# Patient Record
Sex: FEMALE | Race: WHITE | NOT HISPANIC OR LATINO | Employment: OTHER | URBAN - METROPOLITAN AREA
[De-identification: names, ages, dates, MRNs, and addresses within clinical notes are randomized per-mention and may not be internally consistent; named-entity substitution may affect disease eponyms.]

---

## 2019-07-19 ENCOUNTER — TRANSCRIBE ORDERS (OUTPATIENT)
Dept: LAB | Facility: HOSPITAL | Age: 69
End: 2019-07-19

## 2019-07-19 ENCOUNTER — APPOINTMENT (OUTPATIENT)
Dept: LAB | Facility: HOSPITAL | Age: 69
End: 2019-07-19

## 2019-07-19 ENCOUNTER — LAB REQUISITION (OUTPATIENT)
Dept: LAB | Facility: HOSPITAL | Age: 69
End: 2019-07-19

## 2019-07-19 DIAGNOSIS — Z79.01 LONG TERM (CURRENT) USE OF ANTICOAGULANTS: ICD-10-CM

## 2019-07-19 DIAGNOSIS — Z79.01 LONG TERM CURRENT USE OF ANTICOAGULANT: ICD-10-CM

## 2019-07-19 DIAGNOSIS — T81.718A IATROGENIC PULMONARY EMBOLISM AND INFARCTION, INITIAL ENCOUNTER (HCC): Primary | ICD-10-CM

## 2019-07-19 DIAGNOSIS — I26.99 OTHER PULMONARY EMBOLISM WITHOUT ACUTE COR PULMONALE (HCC): ICD-10-CM

## 2019-07-19 DIAGNOSIS — I26.99 IATROGENIC PULMONARY EMBOLISM AND INFARCTION, INITIAL ENCOUNTER (HCC): Primary | ICD-10-CM

## 2019-07-19 LAB
BASOPHILS # BLD AUTO: 0.02 10*3/MM3 (ref 0–0.2)
BASOPHILS NFR BLD AUTO: 0.6 % (ref 0–1.5)
DEPRECATED RDW RBC AUTO: 49 FL (ref 37–54)
EOSINOPHIL # BLD AUTO: 0.27 10*3/MM3 (ref 0–0.4)
EOSINOPHIL NFR BLD AUTO: 8.7 % (ref 0.3–6.2)
ERYTHROCYTE [DISTWIDTH] IN BLOOD BY AUTOMATED COUNT: 13.6 % (ref 12.3–15.4)
HCT VFR BLD AUTO: 30.8 % (ref 34–46.6)
HGB BLD-MCNC: 9.8 G/DL (ref 12–15.9)
IMM GRANULOCYTES # BLD AUTO: 0.01 10*3/MM3 (ref 0–0.05)
IMM GRANULOCYTES NFR BLD AUTO: 0.3 % (ref 0–0.5)
INR PPP: 1.22 (ref 0.85–1.16)
LYMPHOCYTES # BLD AUTO: 0.74 10*3/MM3 (ref 0.7–3.1)
LYMPHOCYTES NFR BLD AUTO: 23.9 % (ref 19.6–45.3)
MCH RBC QN AUTO: 31 PG (ref 26.6–33)
MCHC RBC AUTO-ENTMCNC: 31.8 G/DL (ref 31.5–35.7)
MCV RBC AUTO: 97.5 FL (ref 79–97)
MONOCYTES # BLD AUTO: 0.51 10*3/MM3 (ref 0.1–0.9)
MONOCYTES NFR BLD AUTO: 16.5 % (ref 5–12)
NEUTROPHILS # BLD AUTO: 1.54 10*3/MM3 (ref 1.7–7)
NEUTROPHILS NFR BLD AUTO: 50 % (ref 42.7–76)
NRBC BLD AUTO-RTO: 0 /100 WBC (ref 0–0.2)
PLAT MORPH BLD: NORMAL
PLATELET # BLD AUTO: 217 10*3/MM3 (ref 140–450)
PMV BLD AUTO: 9.4 FL (ref 6–12)
PROTHROMBIN TIME: 14.9 SECONDS (ref 11.2–14.3)
RBC # BLD AUTO: 3.16 10*6/MM3 (ref 3.77–5.28)
RBC MORPH BLD: NORMAL
WBC MORPH BLD: NORMAL
WBC NRBC COR # BLD: 3.09 10*3/MM3 (ref 3.4–10.8)

## 2019-07-19 PROCEDURE — 85610 PROTHROMBIN TIME: CPT | Performed by: ORTHOPAEDIC SURGERY

## 2019-07-19 PROCEDURE — 85025 COMPLETE CBC W/AUTO DIFF WBC: CPT | Performed by: ORTHOPAEDIC SURGERY

## 2019-07-19 PROCEDURE — 85007 BL SMEAR W/DIFF WBC COUNT: CPT | Performed by: ORTHOPAEDIC SURGERY

## 2023-02-15 ENCOUNTER — APPOINTMENT (OUTPATIENT)
Dept: GENERAL RADIOLOGY | Facility: HOSPITAL | Age: 73
End: 2023-02-15
Payer: MEDICARE

## 2023-02-15 ENCOUNTER — APPOINTMENT (OUTPATIENT)
Dept: CT IMAGING | Facility: HOSPITAL | Age: 73
End: 2023-02-15
Payer: MEDICARE

## 2023-02-15 ENCOUNTER — HOSPITAL ENCOUNTER (OUTPATIENT)
Facility: HOSPITAL | Age: 73
Setting detail: OBSERVATION
Discharge: HOME | End: 2023-02-16
Attending: EMERGENCY MEDICINE | Admitting: INTERNAL MEDICINE
Payer: MEDICARE

## 2023-02-15 DIAGNOSIS — Z78.9 ALCOHOL USE: ICD-10-CM

## 2023-02-15 DIAGNOSIS — T07.XXXA MULTIPLE CONTUSIONS: ICD-10-CM

## 2023-02-15 DIAGNOSIS — R40.20 LOSS OF CONSCIOUSNESS: ICD-10-CM

## 2023-02-15 DIAGNOSIS — E87.1 HYPONATREMIA: Primary | ICD-10-CM

## 2023-02-15 PROBLEM — R55 SYNCOPE: Status: ACTIVE | Noted: 2023-02-15

## 2023-02-15 PROBLEM — I10 ESSENTIAL HYPERTENSION: Status: ACTIVE | Noted: 2023-02-15

## 2023-02-15 PROBLEM — R10.9 FLANK PAIN: Status: ACTIVE | Noted: 2023-02-15

## 2023-02-15 PROBLEM — F32.A MILD DEPRESSION: Status: ACTIVE | Noted: 2023-02-15

## 2023-02-15 LAB
ALBUMIN SERPL-MCNC: 4.6 G/DL (ref 3.5–5.2)
ALBUMIN/GLOB SERPL: 1.8 G/DL
ALP SERPL-CCNC: 80 U/L (ref 39–117)
ALT SERPL W P-5'-P-CCNC: 15 U/L (ref 1–33)
ANION GAP SERPL CALCULATED.3IONS-SCNC: 10 MMOL/L (ref 5–15)
ANION GAP SERPL CALCULATED.3IONS-SCNC: 9 MMOL/L (ref 5–15)
AST SERPL-CCNC: 23 U/L (ref 1–32)
BACTERIA UR QL AUTO: ABNORMAL /HPF
BASOPHILS # BLD AUTO: 0.03 10*3/MM3 (ref 0–0.2)
BASOPHILS NFR BLD AUTO: 0.6 % (ref 0–1.5)
BILIRUB SERPL-MCNC: 0.5 MG/DL (ref 0–1.2)
BILIRUB UR QL STRIP: NEGATIVE
BUN SERPL-MCNC: 10 MG/DL (ref 8–23)
BUN SERPL-MCNC: 12 MG/DL (ref 8–23)
BUN/CREAT SERPL: 14.9 (ref 7–25)
BUN/CREAT SERPL: 17.9 (ref 7–25)
CALCIUM SPEC-SCNC: 9.3 MG/DL (ref 8.6–10.5)
CALCIUM SPEC-SCNC: 9.4 MG/DL (ref 8.6–10.5)
CHLORIDE SERPL-SCNC: 90 MMOL/L (ref 98–107)
CHLORIDE SERPL-SCNC: 93 MMOL/L (ref 98–107)
CK SERPL-CCNC: 104 U/L (ref 20–180)
CLARITY UR: CLEAR
CO2 SERPL-SCNC: 28 MMOL/L (ref 22–29)
CO2 SERPL-SCNC: 29 MMOL/L (ref 22–29)
COLOR UR: YELLOW
CREAT BLDA-MCNC: 0.8 MG/DL (ref 0.6–1.3)
CREAT SERPL-MCNC: 0.67 MG/DL (ref 0.57–1)
CREAT SERPL-MCNC: 0.67 MG/DL (ref 0.57–1)
CREAT UR-MCNC: 77.8 MG/DL
DEPRECATED RDW RBC AUTO: 44.4 FL (ref 37–54)
EGFRCR SERPLBLD CKD-EPI 2021: 91.8 ML/MIN/1.73
EGFRCR SERPLBLD CKD-EPI 2021: 91.8 ML/MIN/1.73
EOSINOPHIL # BLD AUTO: 0.16 10*3/MM3 (ref 0–0.4)
EOSINOPHIL NFR BLD AUTO: 3.2 % (ref 0.3–6.2)
ERYTHROCYTE [DISTWIDTH] IN BLOOD BY AUTOMATED COUNT: 12.8 % (ref 12.3–15.4)
GLOBULIN UR ELPH-MCNC: 2.6 GM/DL
GLUCOSE SERPL-MCNC: 205 MG/DL (ref 65–99)
GLUCOSE SERPL-MCNC: 99 MG/DL (ref 65–99)
GLUCOSE UR STRIP-MCNC: NEGATIVE MG/DL
HCT VFR BLD AUTO: 38.8 % (ref 34–46.6)
HGB BLD-MCNC: 13.1 G/DL (ref 12–15.9)
HGB UR QL STRIP.AUTO: NEGATIVE
HOLD SPECIMEN: NORMAL
HYALINE CASTS UR QL AUTO: ABNORMAL /LPF
IMM GRANULOCYTES # BLD AUTO: 0.01 10*3/MM3 (ref 0–0.05)
IMM GRANULOCYTES NFR BLD AUTO: 0.2 % (ref 0–0.5)
KETONES UR QL STRIP: NEGATIVE
LEUKOCYTE ESTERASE UR QL STRIP.AUTO: ABNORMAL
LYMPHOCYTES # BLD AUTO: 1.31 10*3/MM3 (ref 0.7–3.1)
LYMPHOCYTES NFR BLD AUTO: 26.2 % (ref 19.6–45.3)
MCH RBC QN AUTO: 32.2 PG (ref 26.6–33)
MCHC RBC AUTO-ENTMCNC: 33.8 G/DL (ref 31.5–35.7)
MCV RBC AUTO: 95.3 FL (ref 79–97)
MONOCYTES # BLD AUTO: 0.34 10*3/MM3 (ref 0.1–0.9)
MONOCYTES NFR BLD AUTO: 6.8 % (ref 5–12)
NEUTROPHILS NFR BLD AUTO: 3.15 10*3/MM3 (ref 1.7–7)
NEUTROPHILS NFR BLD AUTO: 63 % (ref 42.7–76)
NITRITE UR QL STRIP: NEGATIVE
NRBC BLD AUTO-RTO: 0 /100 WBC (ref 0–0.2)
NT-PROBNP SERPL-MCNC: 149.3 PG/ML (ref 0–900)
OSMOLALITY SERPL: 283 MOSM/KG (ref 275–295)
OSMOLALITY UR: 388 MOSM/KG (ref 300–1100)
PH UR STRIP.AUTO: 7.5 [PH] (ref 5–8)
PLATELET # BLD AUTO: 248 10*3/MM3 (ref 140–450)
PMV BLD AUTO: 8.5 FL (ref 6–12)
POTASSIUM SERPL-SCNC: 4.2 MMOL/L (ref 3.5–5.2)
POTASSIUM SERPL-SCNC: 4.4 MMOL/L (ref 3.5–5.2)
PROT SERPL-MCNC: 7.2 G/DL (ref 6–8.5)
PROT UR QL STRIP: NEGATIVE
RBC # BLD AUTO: 4.07 10*6/MM3 (ref 3.77–5.28)
RBC # UR STRIP: ABNORMAL /HPF
REF LAB TEST METHOD: ABNORMAL
SODIUM SERPL-SCNC: 128 MMOL/L (ref 136–145)
SODIUM SERPL-SCNC: 131 MMOL/L (ref 136–145)
SODIUM UR-SCNC: 28 MMOL/L
SP GR UR STRIP: 1.01 (ref 1–1.03)
SQUAMOUS #/AREA URNS HPF: ABNORMAL /HPF
TROPONIN T SERPL HS-MCNC: 10 NG/L
TROPONIN T SERPL HS-MCNC: 11 NG/L
UROBILINOGEN UR QL STRIP: ABNORMAL
WBC # UR STRIP: ABNORMAL /HPF
WBC NRBC COR # BLD: 5 10*3/MM3 (ref 3.4–10.8)
WHOLE BLOOD HOLD COAG: NORMAL
WHOLE BLOOD HOLD SPECIMEN: NORMAL

## 2023-02-15 PROCEDURE — 82570 ASSAY OF URINE CREATININE: CPT | Performed by: NURSE PRACTITIONER

## 2023-02-15 PROCEDURE — G0378 HOSPITAL OBSERVATION PER HR: HCPCS

## 2023-02-15 PROCEDURE — 81001 URINALYSIS AUTO W/SCOPE: CPT | Performed by: EMERGENCY MEDICINE

## 2023-02-15 PROCEDURE — 70450 CT HEAD/BRAIN W/O DYE: CPT

## 2023-02-15 PROCEDURE — 71260 CT THORAX DX C+: CPT

## 2023-02-15 PROCEDURE — 85025 COMPLETE CBC W/AUTO DIFF WBC: CPT | Performed by: EMERGENCY MEDICINE

## 2023-02-15 PROCEDURE — 83930 ASSAY OF BLOOD OSMOLALITY: CPT | Performed by: NURSE PRACTITIONER

## 2023-02-15 PROCEDURE — 80053 COMPREHEN METABOLIC PANEL: CPT | Performed by: EMERGENCY MEDICINE

## 2023-02-15 PROCEDURE — 96372 THER/PROPH/DIAG INJ SC/IM: CPT

## 2023-02-15 PROCEDURE — 99285 EMERGENCY DEPT VISIT HI MDM: CPT

## 2023-02-15 PROCEDURE — 25010000002 ENOXAPARIN PER 10 MG: Performed by: NURSE PRACTITIONER

## 2023-02-15 PROCEDURE — 84484 ASSAY OF TROPONIN QUANT: CPT | Performed by: EMERGENCY MEDICINE

## 2023-02-15 PROCEDURE — 83880 ASSAY OF NATRIURETIC PEPTIDE: CPT | Performed by: EMERGENCY MEDICINE

## 2023-02-15 PROCEDURE — 74177 CT ABD & PELVIS W/CONTRAST: CPT

## 2023-02-15 PROCEDURE — 84484 ASSAY OF TROPONIN QUANT: CPT | Performed by: NURSE PRACTITIONER

## 2023-02-15 PROCEDURE — 82565 ASSAY OF CREATININE: CPT

## 2023-02-15 PROCEDURE — 25010000002 IOPAMIDOL 61 % SOLUTION: Performed by: EMERGENCY MEDICINE

## 2023-02-15 PROCEDURE — 36415 COLL VENOUS BLD VENIPUNCTURE: CPT

## 2023-02-15 PROCEDURE — 96374 THER/PROPH/DIAG INJ IV PUSH: CPT

## 2023-02-15 PROCEDURE — 25010000002 ONDANSETRON PER 1 MG: Performed by: EMERGENCY MEDICINE

## 2023-02-15 PROCEDURE — 84300 ASSAY OF URINE SODIUM: CPT | Performed by: NURSE PRACTITIONER

## 2023-02-15 PROCEDURE — 99222 1ST HOSP IP/OBS MODERATE 55: CPT | Performed by: NURSE PRACTITIONER

## 2023-02-15 PROCEDURE — 93005 ELECTROCARDIOGRAM TRACING: CPT | Performed by: EMERGENCY MEDICINE

## 2023-02-15 PROCEDURE — 83935 ASSAY OF URINE OSMOLALITY: CPT | Performed by: NURSE PRACTITIONER

## 2023-02-15 PROCEDURE — 82550 ASSAY OF CK (CPK): CPT | Performed by: NURSE PRACTITIONER

## 2023-02-15 RX ORDER — TRAZODONE HYDROCHLORIDE 50 MG/1
50 TABLET ORAL NIGHTLY
COMMUNITY
Start: 2022-09-20

## 2023-02-15 RX ORDER — MULTIPLE VITAMINS W/ MINERALS TAB 9MG-400MCG
1 TAB ORAL NIGHTLY
COMMUNITY

## 2023-02-15 RX ORDER — THIAMINE HYDROCHLORIDE 100 MG/ML
100 INJECTION, SOLUTION INTRAMUSCULAR; INTRAVENOUS ONCE
Status: COMPLETED | OUTPATIENT
Start: 2023-02-15 | End: 2023-02-15

## 2023-02-15 RX ORDER — DIPHENOXYLATE HYDROCHLORIDE AND ATROPINE SULFATE 2.5; .025 MG/1; MG/1
1 TABLET ORAL DAILY
Status: DISCONTINUED | OUTPATIENT
Start: 2023-02-16 | End: 2023-02-15 | Stop reason: SDUPTHER

## 2023-02-15 RX ORDER — SODIUM CHLORIDE 0.9 % (FLUSH) 0.9 %
10 SYRINGE (ML) INJECTION AS NEEDED
Status: DISCONTINUED | OUTPATIENT
Start: 2023-02-15 | End: 2023-02-16 | Stop reason: HOSPADM

## 2023-02-15 RX ORDER — SODIUM CHLORIDE 0.9 % (FLUSH) 0.9 %
10 SYRINGE (ML) INJECTION EVERY 12 HOURS SCHEDULED
Status: DISCONTINUED | OUTPATIENT
Start: 2023-02-15 | End: 2023-02-16 | Stop reason: HOSPADM

## 2023-02-15 RX ORDER — ACETAMINOPHEN 650 MG/1
650 SUPPOSITORY RECTAL EVERY 4 HOURS PRN
Status: DISCONTINUED | OUTPATIENT
Start: 2023-02-15 | End: 2023-02-16 | Stop reason: HOSPADM

## 2023-02-15 RX ORDER — ONDANSETRON 2 MG/ML
4 INJECTION INTRAMUSCULAR; INTRAVENOUS ONCE
Status: COMPLETED | OUTPATIENT
Start: 2023-02-15 | End: 2023-02-15

## 2023-02-15 RX ORDER — CHOLECALCIFEROL (VITAMIN D3) 125 MCG
5 CAPSULE ORAL NIGHTLY
Status: DISCONTINUED | OUTPATIENT
Start: 2023-02-15 | End: 2023-02-16 | Stop reason: HOSPADM

## 2023-02-15 RX ORDER — ACETAMINOPHEN 160 MG/5ML
650 SOLUTION ORAL EVERY 4 HOURS PRN
Status: DISCONTINUED | OUTPATIENT
Start: 2023-02-15 | End: 2023-02-16 | Stop reason: HOSPADM

## 2023-02-15 RX ORDER — TRAZODONE HYDROCHLORIDE 50 MG/1
50 TABLET ORAL NIGHTLY
Status: DISCONTINUED | OUTPATIENT
Start: 2023-02-15 | End: 2023-02-16 | Stop reason: HOSPADM

## 2023-02-15 RX ORDER — ACETAMINOPHEN 500 MG
1000 TABLET ORAL ONCE
Status: COMPLETED | OUTPATIENT
Start: 2023-02-15 | End: 2023-02-15

## 2023-02-15 RX ORDER — FOLIC ACID 1 MG/1
1 TABLET ORAL DAILY
Status: DISCONTINUED | OUTPATIENT
Start: 2023-02-16 | End: 2023-02-16 | Stop reason: HOSPADM

## 2023-02-15 RX ORDER — MULTIPLE VITAMINS W/ MINERALS TAB 9MG-400MCG
1 TAB ORAL NIGHTLY
Status: DISCONTINUED | OUTPATIENT
Start: 2023-02-15 | End: 2023-02-15

## 2023-02-15 RX ORDER — SIMVASTATIN 40 MG
1 TABLET ORAL NIGHTLY
COMMUNITY
Start: 2022-09-20

## 2023-02-15 RX ORDER — SODIUM CHLORIDE 9 MG/ML
40 INJECTION, SOLUTION INTRAVENOUS AS NEEDED
Status: DISCONTINUED | OUTPATIENT
Start: 2023-02-15 | End: 2023-02-16 | Stop reason: HOSPADM

## 2023-02-15 RX ORDER — ESCITALOPRAM OXALATE 10 MG/1
1 TABLET ORAL NIGHTLY
COMMUNITY
Start: 2022-08-25

## 2023-02-15 RX ORDER — ENOXAPARIN SODIUM 100 MG/ML
40 INJECTION SUBCUTANEOUS DAILY
Status: DISCONTINUED | OUTPATIENT
Start: 2023-02-15 | End: 2023-02-16 | Stop reason: HOSPADM

## 2023-02-15 RX ORDER — LISINOPRIL AND HYDROCHLOROTHIAZIDE 20; 12.5 MG/1; MG/1
1 TABLET ORAL NIGHTLY
COMMUNITY
Start: 2022-08-25

## 2023-02-15 RX ORDER — CHOLECALCIFEROL (VITAMIN D3) 125 MCG
1 CAPSULE ORAL NIGHTLY
COMMUNITY

## 2023-02-15 RX ORDER — LISINOPRIL 20 MG/1
20 TABLET ORAL NIGHTLY
Status: DISCONTINUED | OUTPATIENT
Start: 2023-02-15 | End: 2023-02-16 | Stop reason: HOSPADM

## 2023-02-15 RX ORDER — MULTIPLE VITAMINS W/ MINERALS TAB 9MG-400MCG
1 TAB ORAL DAILY
Status: DISCONTINUED | OUTPATIENT
Start: 2023-02-16 | End: 2023-02-16 | Stop reason: HOSPADM

## 2023-02-15 RX ORDER — ACETAMINOPHEN 325 MG/1
650 TABLET ORAL EVERY 4 HOURS PRN
Status: DISCONTINUED | OUTPATIENT
Start: 2023-02-15 | End: 2023-02-16 | Stop reason: HOSPADM

## 2023-02-15 RX ORDER — ATORVASTATIN CALCIUM 20 MG/1
20 TABLET, FILM COATED ORAL NIGHTLY
Status: DISCONTINUED | OUTPATIENT
Start: 2023-02-15 | End: 2023-02-16 | Stop reason: HOSPADM

## 2023-02-15 RX ADMIN — TRAZODONE HYDROCHLORIDE 50 MG: 50 TABLET ORAL at 22:46

## 2023-02-15 RX ADMIN — ACETAMINOPHEN 1000 MG: 500 TABLET, FILM COATED ORAL at 17:08

## 2023-02-15 RX ADMIN — LISINOPRIL 20 MG: 20 TABLET ORAL at 22:46

## 2023-02-15 RX ADMIN — ATORVASTATIN CALCIUM 20 MG: 20 TABLET, FILM COATED ORAL at 22:46

## 2023-02-15 RX ADMIN — Medication 5 MG: at 22:47

## 2023-02-15 RX ADMIN — IOPAMIDOL 90 ML: 612 INJECTION, SOLUTION INTRAVENOUS at 18:08

## 2023-02-15 RX ADMIN — ONDANSETRON 4 MG: 2 INJECTION INTRAMUSCULAR; INTRAVENOUS at 17:06

## 2023-02-15 RX ADMIN — THIAMINE HCL TAB 100 MG 100 MG: 100 TAB at 22:46

## 2023-02-15 RX ADMIN — ENOXAPARIN SODIUM 40 MG: 40 INJECTION SUBCUTANEOUS at 22:46

## 2023-02-15 NOTE — ED PROVIDER NOTES
Subjective   History of Present Illness  Pt fell 5 days ago beside her bed and lost consciousness.  She is unsure if she lost consciousness prior to or after the fall.  She states that when she fell she injured her right chest wall and right abdomen.  She has had pain at this location since that time and she has had shortness of breath, which could be attributed to the chest wall pain.  She has had persistent discomfort since that time.    Pain is pleuritic.  Nonradiating.    Patient was at [facility] prior to her visit here at [facility].  Patient's primary care provider performed a chest x-ray..  Patient's family members are physicians and they were concerned that only a chest x-ray was performed and given her persistent pain and mild shortness of breath they recommended she come come to [facility], [place] emergency department for evaluation.        Review of Systems    No past medical history on file.    Not on File    No past surgical history on file.    No family history on file.    Social History     Socioeconomic History   • Marital status: Unknown           Objective   Physical Exam  Vitals and nursing note reviewed.   Constitutional:       General: She is not in acute distress.  HENT:      Head: Normocephalic and atraumatic.   Eyes:      Conjunctiva/sclera: Conjunctivae normal.      Pupils: Pupils are equal, round, and reactive to light.   Neck:      Thyroid: No thyromegaly.   Cardiovascular:      Rate and Rhythm: Normal rate and regular rhythm.      Heart sounds: Normal heart sounds. No murmur heard.    No friction rub. No gallop.   Pulmonary:      Effort: Pulmonary effort is normal. No respiratory distress.      Breath sounds: Normal breath sounds. No decreased breath sounds.   Chest:      Chest wall: Tenderness (Right lower chest wall tenderness to palpation.  There is a bruise over the lateral aspect of the right breast.  There is moderate bruising to the right posterior chest wall.) present.    Abdominal:      Palpations: Abdomen is soft.      Tenderness: There is abdominal tenderness (Tenderness to palpation of the right side of the abdomen.  Most sensitive in the right upper quadrant.  Moderate bruise appreciated over the right abdomen). There is no guarding or rebound.   Musculoskeletal:         General: Normal range of motion.      Cervical back: Normal range of motion and neck supple.   Lymphadenopathy:      Cervical: No cervical adenopathy.   Skin:     General: Skin is warm and dry.      Comments: Bruises appear to be old, consistent with injury 4 to 5 days ago.   Neurological:      General: No focal deficit present.      Mental Status: She is alert and oriented to person, place, and time.   Psychiatric:         Behavior: Behavior normal.         Procedures           ED Course  ED Course as of 02/16/23 0936   Wed Feb 15, 2023   1900 Sodium(!): 128 [CP]   1904 I plan on discharging the patient after the negative imaging are returned.  Surprisingly her sodium was found to be 128.  This coupled with the unclear etiology of her fall and amnesia towards the events of the night are concerning.  Patient does drink alcohol but states that she has never had an episode similar to the events on Saturday night.  Given this potentially acute hyponatremia along with mental status change with seizure not completely excluded, I think observation and further evaluation of her hyponatremia is appropriate.  I discussed this with Dr. Oates, internal medicine, who will admit for further evaluation and management. [CP]      ED Course User Index  [CP] Stephane Arana DO      Recent Results (from the past 24 hour(s))   ECG 12 Lead ED Triage Standing Order; SOA    Collection Time: 02/15/23  4:44 PM   Result Value Ref Range    QT Interval 322 ms    QTC Interval 423 ms   Comprehensive Metabolic Panel    Collection Time: 02/15/23  4:54 PM    Specimen: Blood   Result Value Ref Range    Glucose 205 (H) 65 - 99 mg/dL    BUN 10 8  - 23 mg/dL    Creatinine 0.67 0.57 - 1.00 mg/dL    Sodium 128 (L) 136 - 145 mmol/L    Potassium 4.4 3.5 - 5.2 mmol/L    Chloride 90 (L) 98 - 107 mmol/L    CO2 28.0 22.0 - 29.0 mmol/L    Calcium 9.4 8.6 - 10.5 mg/dL    Total Protein 7.2 6.0 - 8.5 g/dL    Albumin 4.6 3.5 - 5.2 g/dL    ALT (SGPT) 15 1 - 33 U/L    AST (SGOT) 23 1 - 32 U/L    Alkaline Phosphatase 80 39 - 117 U/L    Total Bilirubin 0.5 0.0 - 1.2 mg/dL    Globulin 2.6 gm/dL    A/G Ratio 1.8 g/dL    BUN/Creatinine Ratio 14.9 7.0 - 25.0    Anion Gap 10.0 5.0 - 15.0 mmol/L    eGFR 91.8 >60.0 mL/min/1.73   BNP    Collection Time: 02/15/23  4:54 PM    Specimen: Blood   Result Value Ref Range    proBNP 149.3 0.0 - 900.0 pg/mL   High Sensitivity Troponin T    Collection Time: 02/15/23  4:54 PM    Specimen: Blood   Result Value Ref Range    HS Troponin T 10 (H) <10 ng/L   Green Top (Gel)    Collection Time: 02/15/23  4:54 PM   Result Value Ref Range    Extra Tube Hold for add-ons.    Lavender Top    Collection Time: 02/15/23  4:54 PM   Result Value Ref Range    Extra Tube hold for add-on    Gold Top - SST    Collection Time: 02/15/23  4:54 PM   Result Value Ref Range    Extra Tube Hold for add-ons.    Gray Top    Collection Time: 02/15/23  4:54 PM   Result Value Ref Range    Extra Tube Hold for add-ons.    Light Blue Top    Collection Time: 02/15/23  4:54 PM   Result Value Ref Range    Extra Tube Hold for add-ons.    CBC Auto Differential    Collection Time: 02/15/23  4:54 PM    Specimen: Blood   Result Value Ref Range    WBC 5.00 3.40 - 10.80 10*3/mm3    RBC 4.07 3.77 - 5.28 10*6/mm3    Hemoglobin 13.1 12.0 - 15.9 g/dL    Hematocrit 38.8 34.0 - 46.6 %    MCV 95.3 79.0 - 97.0 fL    MCH 32.2 26.6 - 33.0 pg    MCHC 33.8 31.5 - 35.7 g/dL    RDW 12.8 12.3 - 15.4 %    RDW-SD 44.4 37.0 - 54.0 fl    MPV 8.5 6.0 - 12.0 fL    Platelets 248 140 - 450 10*3/mm3    Neutrophil % 63.0 42.7 - 76.0 %    Lymphocyte % 26.2 19.6 - 45.3 %    Monocyte % 6.8 5.0 - 12.0 %    Eosinophil %  3.2 0.3 - 6.2 %    Basophil % 0.6 0.0 - 1.5 %    Immature Grans % 0.2 0.0 - 0.5 %    Neutrophils, Absolute 3.15 1.70 - 7.00 10*3/mm3    Lymphocytes, Absolute 1.31 0.70 - 3.10 10*3/mm3    Monocytes, Absolute 0.34 0.10 - 0.90 10*3/mm3    Eosinophils, Absolute 0.16 0.00 - 0.40 10*3/mm3    Basophils, Absolute 0.03 0.00 - 0.20 10*3/mm3    Immature Grans, Absolute 0.01 0.00 - 0.05 10*3/mm3    nRBC 0.0 0.0 - 0.2 /100 WBC   CK    Collection Time: 02/15/23  4:54 PM    Specimen: Blood   Result Value Ref Range    Creatine Kinase 104 20 - 180 U/L   Osmolality, Serum    Collection Time: 02/15/23  4:54 PM    Specimen: Blood   Result Value Ref Range    Osmolality 283 275 - 295 mOsm/kg   POC Creatinine    Collection Time: 02/15/23  5:00 PM    Specimen: Blood   Result Value Ref Range    Creatinine 0.80 0.60 - 1.30 mg/dL   Urinalysis With Microscopic If Indicated (No Culture) - Urine, Clean Catch    Collection Time: 02/15/23  5:10 PM    Specimen: Urine, Clean Catch   Result Value Ref Range    Color, UA Yellow Yellow, Straw    Appearance, UA Clear Clear    pH, UA 7.5 5.0 - 8.0    Specific Gravity, UA 1.014 1.001 - 1.030    Glucose, UA Negative Negative    Ketones, UA Negative Negative    Bilirubin, UA Negative Negative    Blood, UA Negative Negative    Protein, UA Negative Negative    Leuk Esterase, UA Small (1+) (A) Negative    Nitrite, UA Negative Negative    Urobilinogen, UA 1.0 E.U./dL 0.2 - 1.0 E.U./dL   Urinalysis, Microscopic Only - Urine, Clean Catch    Collection Time: 02/15/23  5:10 PM    Specimen: Urine, Clean Catch   Result Value Ref Range    RBC, UA 3-6 (A) None Seen, 0-2 /HPF    WBC, UA 0-2 None Seen, 0-2 /HPF    Bacteria, UA None Seen None Seen, Trace /HPF    Squamous Epithelial Cells, UA 0-2 None Seen, 0-2 /HPF    Hyaline Casts, UA 0-6 0 - 6 /LPF    Methodology Automated Microscopy    Osmolality, Urine - Urine, Clean Catch    Collection Time: 02/15/23  5:10 PM    Specimen: Urine, Clean Catch   Result Value Ref Range     Osmolality, Urine 388 300 - 1,100 mOsm/kg   Creatinine Urine Random (kidney function) GFR component - Urine, Clean Catch    Collection Time: 02/15/23  5:10 PM    Specimen: Urine, Clean Catch   Result Value Ref Range    Creatinine, Urine 77.8 mg/dL   Sodium, Urine, Random - Urine, Clean Catch    Collection Time: 02/15/23  5:10 PM    Specimen: Urine, Clean Catch   Result Value Ref Range    Sodium, Urine 28 mmol/L   High Sensitivity Troponin T    Collection Time: 02/15/23 10:49 PM    Specimen: Blood   Result Value Ref Range    HS Troponin T 11 (H) <10 ng/L   Basic Metabolic Panel    Collection Time: 02/15/23 10:49 PM    Specimen: Blood   Result Value Ref Range    Glucose 99 65 - 99 mg/dL    BUN 12 8 - 23 mg/dL    Creatinine 0.67 0.57 - 1.00 mg/dL    Sodium 131 (L) 136 - 145 mmol/L    Potassium 4.2 3.5 - 5.2 mmol/L    Chloride 93 (L) 98 - 107 mmol/L    CO2 29.0 22.0 - 29.0 mmol/L    Calcium 9.3 8.6 - 10.5 mg/dL    BUN/Creatinine Ratio 17.9 7.0 - 25.0    Anion Gap 9.0 5.0 - 15.0 mmol/L    eGFR 91.8 >60.0 mL/min/1.73   High Sensitivity Troponin T 2Hr    Collection Time: 02/16/23 12:56 AM    Specimen: Blood   Result Value Ref Range    HS Troponin T 12 (H) <10 ng/L    Troponin T Delta 1 >=-4 - <+4 ng/L   Basic Metabolic Panel    Collection Time: 02/16/23  3:34 AM    Specimen: Blood   Result Value Ref Range    Glucose 105 (H) 65 - 99 mg/dL    BUN 12 8 - 23 mg/dL    Creatinine 0.62 0.57 - 1.00 mg/dL    Sodium 133 (L) 136 - 145 mmol/L    Potassium 4.1 3.5 - 5.2 mmol/L    Chloride 96 (L) 98 - 107 mmol/L    CO2 29.0 22.0 - 29.0 mmol/L    Calcium 9.2 8.6 - 10.5 mg/dL    BUN/Creatinine Ratio 19.4 7.0 - 25.0    Anion Gap 8.0 5.0 - 15.0 mmol/L    eGFR 93.6 >60.0 mL/min/1.73   CBC Auto Differential    Collection Time: 02/16/23  3:34 AM    Specimen: Blood   Result Value Ref Range    WBC 4.15 3.40 - 10.80 10*3/mm3    RBC 3.77 3.77 - 5.28 10*6/mm3    Hemoglobin 12.0 12.0 - 15.9 g/dL    Hematocrit 36.3 34.0 - 46.6 %    MCV 96.3 79.0 -  97.0 fL    MCH 31.8 26.6 - 33.0 pg    MCHC 33.1 31.5 - 35.7 g/dL    RDW 13.0 12.3 - 15.4 %    RDW-SD 46.3 37.0 - 54.0 fl    MPV 8.5 6.0 - 12.0 fL    Platelets 214 140 - 450 10*3/mm3    Neutrophil % 44.5 42.7 - 76.0 %    Lymphocyte % 36.4 19.6 - 45.3 %    Monocyte % 10.4 5.0 - 12.0 %    Eosinophil % 7.7 (H) 0.3 - 6.2 %    Basophil % 0.5 0.0 - 1.5 %    Immature Grans % 0.5 0.0 - 0.5 %    Neutrophils, Absolute 1.85 1.70 - 7.00 10*3/mm3    Lymphocytes, Absolute 1.51 0.70 - 3.10 10*3/mm3    Monocytes, Absolute 0.43 0.10 - 0.90 10*3/mm3    Eosinophils, Absolute 0.32 0.00 - 0.40 10*3/mm3    Basophils, Absolute 0.02 0.00 - 0.20 10*3/mm3    Immature Grans, Absolute 0.02 0.00 - 0.05 10*3/mm3    nRBC 0.0 0.0 - 0.2 /100 WBC   Magnesium    Collection Time: 02/16/23  3:34 AM    Specimen: Blood   Result Value Ref Range    Magnesium 2.0 1.6 - 2.4 mg/dL   Cortisol    Collection Time: 02/16/23  3:34 AM    Specimen: Blood   Result Value Ref Range    Cortisol 9.98   mcg/dL   Basic Metabolic Panel    Collection Time: 02/16/23  7:50 AM    Specimen: Blood   Result Value Ref Range    Glucose 92 65 - 99 mg/dL    BUN 11 8 - 23 mg/dL    Creatinine 0.56 (L) 0.57 - 1.00 mg/dL    Sodium 133 (L) 136 - 145 mmol/L    Potassium 4.4 3.5 - 5.2 mmol/L    Chloride 95 (L) 98 - 107 mmol/L    CO2 30.0 (H) 22.0 - 29.0 mmol/L    Calcium 9.2 8.6 - 10.5 mg/dL    BUN/Creatinine Ratio 19.6 7.0 - 25.0    Anion Gap 8.0 5.0 - 15.0 mmol/L    eGFR 95.9 >60.0 mL/min/1.73     Note: In addition to lab results from this visit, the labs listed above may include labs taken at another facility or during a different encounter within the last 24 hours. Please correlate lab times with ED admission and discharge times for further clarification of the services performed during this visit.    CT Head Without Contrast   Final Result   Impression:      1. No acute findings.   2. Volume loss secondary to cerebral atrophy.   3. Chronic white matter microvascular ischemia.       Electronically Signed: Norm Nadya     2/15/2023 9:14 PM EST     Workstation ID: [id number]      CT Chest With Contrast Diagnostic   Final Result   No evidence of acute chest trauma or other active chest disease.            CT SCAN THE ABDOMEN AND PELVIS WITH IV CONTRAST: There is mild diffuse fatty liver change. Gallbladder appears to be surgically absent and there is expected postcholecystectomy dilatation of common bile duct and minimal intrahepatic ductal dilatation.    Spleen is not enlarged. No significant abnormalities are appreciated of the pancreas, adrenal glands, or left kidney. There is a 2 mm nonobstructing right upper pole renal calculus and minimal right upper pole renal cortical scarring. No upper abdominal    free air, ascites, adenopathy, or acute inflammatory change is appreciated. The terminal ileum cecum and appendix lie in the right midabdomen and appear within normal limits.      Regarding lower abdomen pelvis, uterus and ovaries appear to be appropriately atrophic. Bladder is normally distended and normal in appearance. No free fluid or inflammatory change is seen. No evidence of abdominal wall hematoma or other trauma is    appreciated. There is a severe compression deformity of L1, which appears to be chronic, as there is extensive buttressing osteophyte formation along the anterior margin of both disc spaces, and very smooth margins of the vertebral compression deformity.    There appears to only relatively mild bony narrowing of the canal at this level.       Impression:      1. No evidence of acute trauma or other acute abdominal or intrapelvic disease.   2. Nonobstructing 2 mm right upper pole renal calculus.   3. Incidentally noted old L1 vertebral compression deformity with mild spinal canal narrowing.      Electronically Signed: Last Page     2/15/2023 6:42 PM EST     Workstation ID: [id number]      CT Abdomen Pelvis With Contrast   Final Result   No evidence of acute chest trauma or  other active chest disease.            CT SCAN THE ABDOMEN AND PELVIS WITH IV CONTRAST: There is mild diffuse fatty liver change. Gallbladder appears to be surgically absent and there is expected postcholecystectomy dilatation of common bile duct and minimal intrahepatic ductal dilatation.    Spleen is not enlarged. No significant abnormalities are appreciated of the pancreas, adrenal glands, or left kidney. There is a 2 mm nonobstructing right upper pole renal calculus and minimal right upper pole renal cortical scarring. No upper abdominal    free air, ascites, adenopathy, or acute inflammatory change is appreciated. The terminal ileum cecum and appendix lie in the right midabdomen and appear within normal limits.      Regarding lower abdomen pelvis, uterus and ovaries appear to be appropriately atrophic. Bladder is normally distended and normal in appearance. No free fluid or inflammatory change is seen. No evidence of abdominal wall hematoma or other trauma is    appreciated. There is a severe compression deformity of L1, which appears to be chronic, as there is extensive buttressing osteophyte formation along the anterior margin of both disc spaces, and very smooth margins of the vertebral compression deformity.    There appears to only relatively mild bony narrowing of the canal at this level.       Impression:      1. No evidence of acute trauma or other acute abdominal or intrapelvic disease.   2. Nonobstructing 2 mm right upper pole renal calculus.   3. Incidentally noted old L1 vertebral compression deformity with mild spinal canal narrowing.      Electronically Signed: Last Page     2/15/2023 6:42 PM EST     Workstation ID: [id number]        Vitals:    02/16/23 0100 02/16/23 0340 02/16/23 0500 02/16/23 0720   BP:  128/68  116/57   BP Location:  Right arm     Patient Position:  Lying     Pulse: 75 82 66 77   Resp:  20  18   Temp:  98.3 °F (36.8 °C)  98.3 °F (36.8 °C)   TempSrc:  Oral  Oral   SpO2: 91% 99%  95% 96%   Weight:       Height:         Medications   sodium chloride 0.9 % flush 10 mL (has no administration in time range)   melatonin tablet 5 mg (5 mg Oral Given 2/15/23 2247)   atorvastatin (LIPITOR) tablet 20 mg (20 mg Oral Given 2/15/23 2246)   traZODone (DESYREL) tablet 50 mg (50 mg Oral Given 2/15/23 2246)   sodium chloride 0.9 % flush 10 mL (10 mL Intravenous Not Given 2/16/23 0801)   sodium chloride 0.9 % flush 10 mL (has no administration in time range)   sodium chloride 0.9 % infusion 40 mL (has no administration in time range)   Enoxaparin Sodium (LOVENOX) syringe 40 mg (40 mg Subcutaneous Given 2/15/23 2246)   acetaminophen (TYLENOL) tablet 650 mg (650 mg Oral Given 2/16/23 0821)     Or   acetaminophen (TYLENOL) 160 MG/5ML solution 650 mg ( Oral Not Given:  See Alt 2/16/23 0821)     Or   acetaminophen (TYLENOL) suppository 650 mg ( Rectal Not Given:  See Alt 2/16/23 0821)   thiamine (VITAMIN B-1) tablet 100 mg (100 mg Oral Given 2/16/23 0814)     And   folic acid (FOLVITE) tablet 1 mg (1 mg Oral Given 2/16/23 0814)   lisinopril (PRINIVIL,ZESTRIL) tablet 20 mg (20 mg Oral Given 2/15/23 2246)   multivitamin with minerals 1 tablet (1 tablet Oral Given 2/16/23 0814)   ondansetron (ZOFRAN) injection 4 mg (4 mg Intravenous Given 2/15/23 1706)   acetaminophen (TYLENOL) tablet 1,000 mg (1,000 mg Oral Given 2/15/23 1708)   iopamidol (ISOVUE-300) 61 % injection 100 mL (90 mL Intravenous Given 2/15/23 1808)   thiamine (B-1) injection 100 mg ( Intravenous Not Given:  See Alt 2/15/23 2246)     Or   thiamine (VITAMIN B-1) tablet 100 mg (100 mg Oral Given 2/15/23 2246)     ECG/EMG Results (last 24 hours)     Procedure Component Value Units Date/Time    ECG 12 Lead ED Triage Standing Order; SOA [[id number]] Collected: 02/15/23 1644     Updated: 02/15/23 1646     QT Interval 322 ms      QTC Interval 423 ms     Narrative:      Test Reason : ED Triage Standing Order~  Blood Pressure :   */*   mmHG  Vent. Rate : 104 BPM      Atrial Rate : 104 BPM     P-R Int : 174 ms          QRS Dur :  64 ms      QT Int : 322 ms       P-R-T Axes :  -1  55  51 degrees     QTc Int : 423 ms    Sinus tachycardia  Otherwise normal ECG  No previous ECGs available    Referred By:            Confirmed By:         ECG 12 Lead ED Triage Standing Order; SOA   Preliminary Result   Test Reason : ED Triage Standing Order~   Blood Pressure :   */*   mmHG   Vent. Rate : 104 BPM     Atrial Rate : 104 BPM      P-R Int : 174 ms          QRS Dur :  64 ms       QT Int : 322 ms       P-R-T Axes :  -1  55  51 degrees      QTc Int : 423 ms      Sinus tachycardia   Otherwise normal ECG   No previous ECGs available      Referred By:            Confirmed By:                                                Medical Decision Making  Alcohol use: chronic illness or injury  Hyponatremia: complicated acute illness or injury  Loss of consciousness (HCC): complicated acute illness or injury  Multiple contusions: complicated acute illness or injury  Amount and/or Complexity of Data Reviewed  Labs: ordered. Decision-making details documented in ED Course.  Radiology: ordered and independent interpretation performed. Decision-making details documented in ED Course.  ECG/medicine tests: ordered and independent interpretation performed. Decision-making details documented in ED Course.      Risk  OTC drugs.  Prescription drug management.  Decision regarding hospitalization.          Final diagnoses:   Hyponatremia   Loss of consciousness (HCC)   Multiple contusions   Alcohol use       ED Disposition  ED Disposition     ED Disposition   Decision to Admit    Condition   --    Comment   Level of Care: Telemetry [5]   Diagnosis: Hyponatremia [818096]   Admitting Physician: BOBBY MONGE III [[id number]   Attending Physician: BOBBY MONGE III [[id number]   Bed Request Comments: tele obs                Stephane Arana DO  02/16/23 0939

## 2023-02-16 ENCOUNTER — APPOINTMENT (OUTPATIENT)
Dept: CARDIOLOGY | Facility: HOSPITAL | Age: 73
End: 2023-02-16
Payer: MEDICARE

## 2023-02-16 VITALS
TEMPERATURE: 98.3 F | RESPIRATION RATE: 18 BRPM | WEIGHT: 214 LBS | BODY MASS INDEX: 32.43 KG/M2 | HEIGHT: 68 IN | DIASTOLIC BLOOD PRESSURE: 78 MMHG | OXYGEN SATURATION: 95 % | SYSTOLIC BLOOD PRESSURE: 142 MMHG | HEART RATE: 84 BPM

## 2023-02-16 LAB
ANION GAP SERPL CALCULATED.3IONS-SCNC: 11 MMOL/L (ref 5–15)
ANION GAP SERPL CALCULATED.3IONS-SCNC: 8 MMOL/L (ref 5–15)
ANION GAP SERPL CALCULATED.3IONS-SCNC: 8 MMOL/L (ref 5–15)
BASOPHILS # BLD AUTO: 0.02 10*3/MM3 (ref 0–0.2)
BASOPHILS NFR BLD AUTO: 0.5 % (ref 0–1.5)
BH CV XLRA MEAS LEFT DIST CCA EDV: 13 CM/SEC
BH CV XLRA MEAS LEFT DIST CCA PSV: 105 CM/SEC
BH CV XLRA MEAS LEFT DIST ICA EDV: 16.5 CM/SEC
BH CV XLRA MEAS LEFT DIST ICA PSV: 57.6 CM/SEC
BH CV XLRA MEAS LEFT ICA/CCA RATIO: 0.56
BH CV XLRA MEAS LEFT MID CCA EDV: 19.1 CM/SEC
BH CV XLRA MEAS LEFT MID CCA PSV: 148 CM/SEC
BH CV XLRA MEAS LEFT MID ICA EDV: 31.3 CM/SEC
BH CV XLRA MEAS LEFT MID ICA PSV: 82.9 CM/SEC
BH CV XLRA MEAS LEFT PROX CCA EDV: 19.9 CM/SEC
BH CV XLRA MEAS LEFT PROX CCA PSV: 172 CM/SEC
BH CV XLRA MEAS LEFT PROX ECA EDV: 11 CM/SEC
BH CV XLRA MEAS LEFT PROX ECA PSV: 94.4 CM/SEC
BH CV XLRA MEAS LEFT PROX ICA EDV: 12.3 CM/SEC
BH CV XLRA MEAS LEFT PROX ICA PSV: 41.3 CM/SEC
BH CV XLRA MEAS LEFT PROX SCLA PSV: 152 CM/SEC
BH CV XLRA MEAS LEFT VERTEBRAL A EDV: 19.8 CM/SEC
BH CV XLRA MEAS LEFT VERTEBRAL A PSV: 52.7 CM/SEC
BH CV XLRA MEAS RIGHT DIST CCA EDV: 26.6 CM/SEC
BH CV XLRA MEAS RIGHT DIST CCA PSV: 101 CM/SEC
BH CV XLRA MEAS RIGHT DIST ICA EDV: 17.6 CM/SEC
BH CV XLRA MEAS RIGHT DIST ICA PSV: 58.2 CM/SEC
BH CV XLRA MEAS RIGHT ICA/CCA RATIO: 0.85
BH CV XLRA MEAS RIGHT MID CCA EDV: 31.5 CM/SEC
BH CV XLRA MEAS RIGHT MID CCA PSV: 145 CM/SEC
BH CV XLRA MEAS RIGHT MID ICA EDV: 18.1 CM/SEC
BH CV XLRA MEAS RIGHT MID ICA PSV: 72.4 CM/SEC
BH CV XLRA MEAS RIGHT PROX CCA EDV: 16.8 CM/SEC
BH CV XLRA MEAS RIGHT PROX CCA PSV: 125 CM/SEC
BH CV XLRA MEAS RIGHT PROX ECA EDV: 14 CM/SEC
BH CV XLRA MEAS RIGHT PROX ECA PSV: 135 CM/SEC
BH CV XLRA MEAS RIGHT PROX ICA EDV: 31.5 CM/SEC
BH CV XLRA MEAS RIGHT PROX ICA PSV: 123 CM/SEC
BH CV XLRA MEAS RIGHT PROX SCLA PSV: 104 CM/SEC
BH CV XLRA MEAS RIGHT VERTEBRAL A EDV: 10.3 CM/SEC
BH CV XLRA MEAS RIGHT VERTEBRAL A PSV: 50.1 CM/SEC
BUN SERPL-MCNC: 11 MG/DL (ref 8–23)
BUN SERPL-MCNC: 11 MG/DL (ref 8–23)
BUN SERPL-MCNC: 12 MG/DL (ref 8–23)
BUN/CREAT SERPL: 19.4 (ref 7–25)
BUN/CREAT SERPL: 19.6 (ref 7–25)
BUN/CREAT SERPL: 20.8 (ref 7–25)
CALCIUM SPEC-SCNC: 9.1 MG/DL (ref 8.6–10.5)
CALCIUM SPEC-SCNC: 9.2 MG/DL (ref 8.6–10.5)
CALCIUM SPEC-SCNC: 9.2 MG/DL (ref 8.6–10.5)
CHLORIDE SERPL-SCNC: 94 MMOL/L (ref 98–107)
CHLORIDE SERPL-SCNC: 95 MMOL/L (ref 98–107)
CHLORIDE SERPL-SCNC: 96 MMOL/L (ref 98–107)
CO2 SERPL-SCNC: 27 MMOL/L (ref 22–29)
CO2 SERPL-SCNC: 29 MMOL/L (ref 22–29)
CO2 SERPL-SCNC: 30 MMOL/L (ref 22–29)
CORTIS SERPL-MCNC: 9.98 MCG/DL
CREAT SERPL-MCNC: 0.53 MG/DL (ref 0.57–1)
CREAT SERPL-MCNC: 0.56 MG/DL (ref 0.57–1)
CREAT SERPL-MCNC: 0.62 MG/DL (ref 0.57–1)
DEPRECATED RDW RBC AUTO: 46.3 FL (ref 37–54)
EGFRCR SERPLBLD CKD-EPI 2021: 93.6 ML/MIN/1.73
EGFRCR SERPLBLD CKD-EPI 2021: 95.9 ML/MIN/1.73
EGFRCR SERPLBLD CKD-EPI 2021: 97.2 ML/MIN/1.73
EOSINOPHIL # BLD AUTO: 0.32 10*3/MM3 (ref 0–0.4)
EOSINOPHIL NFR BLD AUTO: 7.7 % (ref 0.3–6.2)
ERYTHROCYTE [DISTWIDTH] IN BLOOD BY AUTOMATED COUNT: 13 % (ref 12.3–15.4)
GEN 5 2HR TROPONIN T REFLEX: 12 NG/L
GLUCOSE SERPL-MCNC: 105 MG/DL (ref 65–99)
GLUCOSE SERPL-MCNC: 92 MG/DL (ref 65–99)
GLUCOSE SERPL-MCNC: 99 MG/DL (ref 65–99)
HCT VFR BLD AUTO: 36.3 % (ref 34–46.6)
HGB BLD-MCNC: 12 G/DL (ref 12–15.9)
IMM GRANULOCYTES # BLD AUTO: 0.02 10*3/MM3 (ref 0–0.05)
IMM GRANULOCYTES NFR BLD AUTO: 0.5 % (ref 0–0.5)
LYMPHOCYTES # BLD AUTO: 1.51 10*3/MM3 (ref 0.7–3.1)
LYMPHOCYTES NFR BLD AUTO: 36.4 % (ref 19.6–45.3)
MAGNESIUM SERPL-MCNC: 2 MG/DL (ref 1.6–2.4)
MAXIMAL PREDICTED HEART RATE: 146 BPM
MCH RBC QN AUTO: 31.8 PG (ref 26.6–33)
MCHC RBC AUTO-ENTMCNC: 33.1 G/DL (ref 31.5–35.7)
MCV RBC AUTO: 96.3 FL (ref 79–97)
MONOCYTES # BLD AUTO: 0.43 10*3/MM3 (ref 0.1–0.9)
MONOCYTES NFR BLD AUTO: 10.4 % (ref 5–12)
NEUTROPHILS NFR BLD AUTO: 1.85 10*3/MM3 (ref 1.7–7)
NEUTROPHILS NFR BLD AUTO: 44.5 % (ref 42.7–76)
NRBC BLD AUTO-RTO: 0 /100 WBC (ref 0–0.2)
PLATELET # BLD AUTO: 214 10*3/MM3 (ref 140–450)
PMV BLD AUTO: 8.5 FL (ref 6–12)
POTASSIUM SERPL-SCNC: 4.1 MMOL/L (ref 3.5–5.2)
POTASSIUM SERPL-SCNC: 4.4 MMOL/L (ref 3.5–5.2)
POTASSIUM SERPL-SCNC: 4.7 MMOL/L (ref 3.5–5.2)
RBC # BLD AUTO: 3.77 10*6/MM3 (ref 3.77–5.28)
RIGHT ARM BP: NORMAL MMHG
SODIUM SERPL-SCNC: 132 MMOL/L (ref 136–145)
SODIUM SERPL-SCNC: 133 MMOL/L (ref 136–145)
SODIUM SERPL-SCNC: 133 MMOL/L (ref 136–145)
STRESS TARGET HR: 124 BPM
TROPONIN T DELTA: 1 NG/L
WBC NRBC COR # BLD: 4.15 10*3/MM3 (ref 3.4–10.8)

## 2023-02-16 PROCEDURE — 99238 HOSP IP/OBS DSCHRG MGMT 30/<: CPT | Performed by: INTERNAL MEDICINE

## 2023-02-16 PROCEDURE — G0378 HOSPITAL OBSERVATION PER HR: HCPCS

## 2023-02-16 PROCEDURE — 93880 EXTRACRANIAL BILAT STUDY: CPT

## 2023-02-16 PROCEDURE — 85025 COMPLETE CBC W/AUTO DIFF WBC: CPT | Performed by: NURSE PRACTITIONER

## 2023-02-16 PROCEDURE — 93880 EXTRACRANIAL BILAT STUDY: CPT | Performed by: INTERNAL MEDICINE

## 2023-02-16 PROCEDURE — 80048 BASIC METABOLIC PNL TOTAL CA: CPT | Performed by: NURSE PRACTITIONER

## 2023-02-16 PROCEDURE — 84484 ASSAY OF TROPONIN QUANT: CPT | Performed by: NURSE PRACTITIONER

## 2023-02-16 PROCEDURE — 83735 ASSAY OF MAGNESIUM: CPT | Performed by: NURSE PRACTITIONER

## 2023-02-16 PROCEDURE — 82533 TOTAL CORTISOL: CPT | Performed by: NURSE PRACTITIONER

## 2023-02-16 RX ADMIN — MULTIPLE VITAMINS W/ MINERALS TAB 1 TABLET: TAB at 08:14

## 2023-02-16 RX ADMIN — FOLIC ACID 1 MG: 1 TABLET ORAL at 08:14

## 2023-02-16 RX ADMIN — THIAMINE HCL TAB 100 MG 100 MG: 100 TAB at 08:14

## 2023-02-16 RX ADMIN — ACETAMINOPHEN 325MG 650 MG: 325 TABLET ORAL at 08:21

## 2023-02-16 NOTE — H&P
[facility]  HISTORY AND PHYSICAL    Patient Name: Lizbeth Mckeon  : 1950  MRN: [id number]  Primary Care Physician: Nydia Guzmán MD  Date of admission: 2/15/2023    Subjective   Subjective     Chief Complaint:  Right flank pain, right chest wall pain    HPI:  Lizbeth Mckeon is a 72 y.o. female with PMH significant for atrial tachycardia s/p cardiac ablation, carotid artery disease, stroke s/p carotid endarterectomy, HTN, chronic hyponatremia, peripheral neuropathy, depression, who presents to the ED with complaint of right flank pain and right chest wall pain.  She states that 4 days ago she woke up in the floor in the early hours of the morning.  She does not recall any events causing her to fall out of bed.  Since that time she has had right sided abdominal pain and right chest wall pain.  She states that she was evaluated by her PCP and had CXR completed that was negative but continues to have increased pain.  She notes that she does have a history of orthostatic hypotension and syncope.  Upon arrival to the ED, she is noted to be hyponatremic.  She does report recent dieting with increased water intake.  CT abdomen/pelvis and CT chest with contrast are negative for acute findings.  She will be admitted to hospital medicine for further evaluation.    Review of Systems   Constitutional: Positive for activity change. Negative for appetite change, chills, diaphoresis, fatigue and fever.   HENT: Negative.  Negative for congestion, sore throat and trouble swallowing.    Eyes: Negative.  Negative for visual disturbance.   Respiratory: Positive for shortness of breath (Related to pain). Negative for chest tightness and wheezing.    Cardiovascular: Negative for chest pain, palpitations and leg swelling.   Gastrointestinal: Positive for abdominal pain. Negative for abdominal distention, constipation, diarrhea, nausea and vomiting.   Endocrine: Negative.   Negative for polydipsia and polyuria.   Genitourinary: Negative.  Negative for decreased urine volume, difficulty urinating, dysuria, frequency and urgency.   Musculoskeletal: Positive for back pain. Negative for arthralgias, gait problem, myalgias and neck pain.   Skin: Positive for color change (Bruising right flank, right breast).   Allergic/Immunologic: Negative.  Negative for immunocompromised state.   Neurological: Positive for syncope. Negative for dizziness, speech difficulty, weakness, light-headedness, numbness and headaches.   Hematological: Negative.  Does not bruise/bleed easily.   Psychiatric/Behavioral: Negative for confusion. The patient is not nervous/anxious.         All other systems reviewed and are negative.     Personal History     Past Medical History:   Diagnosis Date   • Atrial tachycardia (HCC)    • Carotid artery disease (HCC)    • Depression    • Hypertension    • Hyponatremia    • Peripheral neuropathy    • Stroke (cerebrum) (HCC)        Past Surgical History:   Procedure Laterality Date   • CARDIAC ABLATION     • CAROTID ENDARTERECTOMY Left    • CHOLECYSTECTOMY     • KNEE ARTHROPLASTY Bilateral    • NISSEN FUNDOPLICATION     • TONSILLECTOMY AND ADENOIDECTOMY         Family History:  family history includes Cancer in her mother; Multiple sclerosis in her mother; Stroke in her father. Otherwise pertinent FHx was reviewed and unremarkable.     Social History:  reports that she quit smoking about 31 years ago. Her smoking use included cigarettes. She has never used smokeless tobacco. She reports that she does not currently use alcohol. She reports that she does not use drugs.  Social History     Social History Narrative   • Not on file       Medications:  escitalopram, lisinopril-hydrochlorothiazide, melatonin, multivitamin with minerals, simvastatin, and traZODone    Allergies   Allergen Reactions   • Penicillins Hives   • Sulfa Antibiotics GI Intolerance       Objective   Objective      Vital Signs:   Temp:  [98.6 °F (37 °C)] 98.6 °F (37 °C)  Heart Rate:  [100-115] 100  Resp:  [20] 20  BP: (143-159)/(64-92) 159/92    Physical Exam   Constitutional: Awake, alert, no acute distress  Eyes: PERRLA, sclerae anicteric, no conjunctival injection  HENT: NCAT, mucous membranes moist  Neck: Supple, no thyromegaly, no lymphadenopathy, trachea midline  Respiratory: Clear to auscultation bilaterally, nonlabored respirations   Cardiovascular: Mild tachycardia, no murmurs, rubs, or gallops, palpable pedal pulses bilaterally  Gastrointestinal: Positive bowel sounds, soft, nontender, nondistended  Musculoskeletal: No bilateral ankle edema, no clubbing or cyanosis to extremities  Psychiatric: Appropriate affect, cooperative  Neurologic: Oriented x 3, strength symmetric in all extremities, Cranial Nerves grossly intact to confrontation, speech clear  Skin: No rashes, old bruising noted upper right flank, right lower breast, under right breast, and right lower shoulder      Result Review:  I have personally reviewed the results from the time of this admission to 2/15/2023 20:26 EST and agree with these findings:  [x]  Laboratory list / accordion  []  Microbiology  [x]  Radiology  []  EKG/Telemetry   []  Cardiology/Vascular   []  Pathology  [x]  Old records  []  Other:  Most notable findings include:     LAB RESULTS:      Lab 02/15/23  1654   WBC 5.00   HEMOGLOBIN 13.1   HEMATOCRIT 38.8   PLATELETS 248   NEUTROS ABS 3.15   IMMATURE GRANS (ABS) 0.01   LYMPHS ABS 1.31   MONOS ABS 0.34   EOS ABS 0.16   MCV 95.3         Lab 02/15/23  1700 02/15/23  1654   SODIUM  --  128*   POTASSIUM  --  4.4   CHLORIDE  --  90*   CO2  --  28.0   ANION GAP  --  10.0   BUN  --  10   CREATININE 0.80 0.67   EGFR  --  91.8   GLUCOSE  --  205*   CALCIUM  --  9.4         Lab 02/15/23  1654   TOTAL PROTEIN 7.2   ALBUMIN 4.6   GLOBULIN 2.6   ALT (SGPT) 15   AST (SGOT) 23   BILIRUBIN 0.5   ALK PHOS 80         Lab 02/15/23  1654   PROBNP 149.3    HSTROP T 10*                 Brief Urine Lab Results  (Last result in the past 365 days)      Color   Clarity   Blood   Leuk Est   Nitrite   Protein   CREAT   Urine HCG        02/15/23 1710 Yellow   Clear   Negative   Small (1+)   Negative   Negative               Microbiology Results (last 10 days)     ** No results found for the last 240 hours. **          CT Chest With Contrast Diagnostic    Result Date: 2/15/2023  CT CHEST W CONTRAST DIAGNOSTIC, CT ABDOMEN PELVIS W CONTRAST Date of Exam: 2/15/2023 5:57 PM EST Indication: Right chest pain, s/p fall 4-5 days ago. Comparison: None available. Technique: Axial CT images were obtained of the chest, abdomen and pelvis after the uneventful intravenous administration of 90 mL Isovue-300.  Reconstructed coronal and sagittal images were also obtained. Automated exposure control and iterative construction methods were used. Findings: CT SCAN OF THE CHEST WITH IV CONTRAST: Mediastinal window images show no evidence of pericardial or pleural effusion, and no evidence of adenopathy. There is mild to moderate aortic valve calcification, moderate coronary artery calcification. No thoracic  aortic aneurysm or dissection is seen. Pulmonary arteries are relatively prominent, main pulmonary artery 3.2 cm in diameter, right pulmonary artery 2.7 cm in diameter and left pulmonary artery 2.6 cm, of only questionable significance. No pulmonary embolic disease is seen. Lungs appear clear of active disease. There is a calcified left upper lobe granuloma, and mild nonmasslike right apical scarring. Airways appear to be normally patent. Review of the study with attention to the bony structures shows no evidence of displaced rib fracture or chest wall hematoma. Nondisplaced rib fractures can be difficult to detect, even on CT scan, but none are identified on this study. No sternal fracture or thoracic vertebral compression deformity is seen.     Impression: No evidence of acute chest trauma  or other active chest disease. CT SCAN THE ABDOMEN AND PELVIS WITH IV CONTRAST: There is mild diffuse fatty liver change. Gallbladder appears to be surgically absent and there is expected postcholecystectomy dilatation of common bile duct and minimal intrahepatic ductal dilatation. Spleen is not enlarged. No significant abnormalities are appreciated of the pancreas, adrenal glands, or left kidney. There is a 2 mm nonobstructing right upper pole renal calculus and minimal right upper pole renal cortical scarring. No upper abdominal free air, ascites, adenopathy, or acute inflammatory change is appreciated. The terminal ileum cecum and appendix lie in the right midabdomen and appear within normal limits. Regarding lower abdomen pelvis, uterus and ovaries appear to be appropriately atrophic. Bladder is normally distended and normal in appearance. No free fluid or inflammatory change is seen. No evidence of abdominal wall hematoma or other trauma is appreciated. There is a severe compression deformity of L1, which appears to be chronic, as there is extensive buttressing osteophyte formation along the anterior margin of both disc spaces, and very smooth margins of the vertebral compression deformity.  There appears to only relatively mild bony narrowing of the canal at this level.  Impression: 1. No evidence of acute trauma or other acute abdominal or intrapelvic disease. 2. Nonobstructing 2 mm right upper pole renal calculus. 3. Incidentally noted old L1 vertebral compression deformity with mild spinal canal narrowing. Electronically Signed: Last Page  2/15/2023 6:42 PM EST  Workstation ID: [id number]    CT Abdomen Pelvis With Contrast    Result Date: 2/15/2023  CT CHEST W CONTRAST DIAGNOSTIC, CT ABDOMEN PELVIS W CONTRAST Date of Exam: 2/15/2023 5:57 PM EST Indication: Right chest pain, s/p fall 4-5 days ago. Comparison: None available. Technique: Axial CT images were obtained of the chest, abdomen and pelvis after the  uneventful intravenous administration of 90 mL Isovue-300.  Reconstructed coronal and sagittal images were also obtained. Automated exposure control and iterative construction methods were used. Findings: CT SCAN OF THE CHEST WITH IV CONTRAST: Mediastinal window images show no evidence of pericardial or pleural effusion, and no evidence of adenopathy. There is mild to moderate aortic valve calcification, moderate coronary artery calcification. No thoracic  aortic aneurysm or dissection is seen. Pulmonary arteries are relatively prominent, main pulmonary artery 3.2 cm in diameter, right pulmonary artery 2.7 cm in diameter and left pulmonary artery 2.6 cm, of only questionable significance. No pulmonary embolic disease is seen. Lungs appear clear of active disease. There is a calcified left upper lobe granuloma, and mild nonmasslike right apical scarring. Airways appear to be normally patent. Review of the study with attention to the bony structures shows no evidence of displaced rib fracture or chest wall hematoma. Nondisplaced rib fractures can be difficult to detect, even on CT scan, but none are identified on this study. No sternal fracture or thoracic vertebral compression deformity is seen.     Impression: No evidence of acute chest trauma or other active chest disease. CT SCAN THE ABDOMEN AND PELVIS WITH IV CONTRAST: There is mild diffuse fatty liver change. Gallbladder appears to be surgically absent and there is expected postcholecystectomy dilatation of common bile duct and minimal intrahepatic ductal dilatation. Spleen is not enlarged. No significant abnormalities are appreciated of the pancreas, adrenal glands, or left kidney. There is a 2 mm nonobstructing right upper pole renal calculus and minimal right upper pole renal cortical scarring. No upper abdominal free air, ascites, adenopathy, or acute inflammatory change is appreciated. The terminal ileum cecum and appendix lie in the right midabdomen and  appear within normal limits. Regarding lower abdomen pelvis, uterus and ovaries appear to be appropriately atrophic. Bladder is normally distended and normal in appearance. No free fluid or inflammatory change is seen. No evidence of abdominal wall hematoma or other trauma is appreciated. There is a severe compression deformity of L1, which appears to be chronic, as there is extensive buttressing osteophyte formation along the anterior margin of both disc spaces, and very smooth margins of the vertebral compression deformity.  There appears to only relatively mild bony narrowing of the canal at this level.  Impression: 1. No evidence of acute trauma or other acute abdominal or intrapelvic disease. 2. Nonobstructing 2 mm right upper pole renal calculus. 3. Incidentally noted old L1 vertebral compression deformity with mild spinal canal narrowing. Electronically Signed: Last Page  2/15/2023 6:42 PM EST  Workstation ID: [id number]          Assessment & Plan   Assessment & Plan       Hyponatremia    Syncope    Essential hypertension    Mild depression    Flank pain    72 y.o. female with PMH significant for atrial tachycardia s/p cardiac ablation, carotid artery disease, stroke s/p carotid endarterectomy, HTN, chronic hyponatremia, peripheral neuropathy, depression, who presents to the ED with complaint of right flank pain and right chest wall pain s/p fall from bed 4 days ago with question of syncope as well as hyponatremia.    Hyponatremia  - Corrected sodium 130  - History of mild hyponatremia, most recent sodium on 1/13/2023 135  - Reports increased water intake  - Urine studies and serum osmolality pending  - Serial BMP  - Hold IVF fluid pending labs    Question of syncope  - Patient does not remember events of falling from bed, only waking up on floor  - History of carotid artery disease  - Carotid duplex in a.m.  -CT head pending     Hypertension  Hyperlipidemia  - Plan to continue lisinopril and hold  HCTZ  -continue statin     Depression  - hold lexapro secondary to hyponatremia, restart as appropriate     Alcohol use  - 1 bottle of wine nightly  - No history of withdrawal  - [facility] protocol  - Vitamin replacement    DVT prophylaxis: Lovenox    CODE STATUS:    Code Status (Patient has no pulse and is not breathing): CPR (Attempt to Resuscitate)  Medical Interventions (Patient has pulse or is breathing): Full Support      Expected Discharge  Expected Discharge Date and Time     Expected Discharge Date Expected Discharge Time    Feb 17, 2023             Signature: Electronically signed by SAL Leonardo, 02/15/23, 8:26 PM EST.  Total APC time spent 61 minutes       Case discussed with APC.  H&P reviewed.  Seen and billed independently by APC.    Electronically signed by Dago Oates III, DO, 02/15/23, 10:41 PM EST.

## 2023-02-16 NOTE — PLAN OF CARE
Problem: Adult Inpatient Plan of Care  Goal: Plan of Care Review  Outcome: Met  Goal: Patient-Specific Goal (Individualized)  Outcome: Met  Goal: Absence of Hospital-Acquired Illness or Injury  Outcome: Met  Intervention: Identify and Manage Fall Risk  Recent Flowsheet Documentation  Taken 2/16/2023 1200 by Serena Hussein RN  Safety Promotion/Fall Prevention:   activity supervised   assistive device/personal items within reach   clutter free environment maintained   fall prevention program maintained   nonskid shoes/slippers when out of bed   safety round/check completed  Taken 2/16/2023 1000 by Serena Hussein RN  Safety Promotion/Fall Prevention:   activity supervised   assistive device/personal items within reach   clutter free environment maintained   fall prevention program maintained   nonskid shoes/slippers when out of bed   room organization consistent   safety round/check completed  Taken 2/16/2023 0800 by Serena Hussein RN  Safety Promotion/Fall Prevention:   activity supervised   assistive device/personal items within reach   clutter free environment maintained   fall prevention program maintained   nonskid shoes/slippers when out of bed   room organization consistent   safety round/check completed  Intervention: Prevent Skin Injury  Recent Flowsheet Documentation  Taken 2/16/2023 1200 by Serena Hussein RN  Body Position: position changed independently  Taken 2/16/2023 1000 by Serena Hussein RN  Body Position: position changed independently  Taken 2/16/2023 0800 by Serena Hussein RN  Body Position: position changed independently  Intervention: Prevent and Manage VTE (Venous Thromboembolism) Risk  Recent Flowsheet Documentation  Taken 2/16/2023 1200 by Serena Hussein RN  Activity Management: activity adjusted per tolerance  Taken 2/16/2023 1000 by Serena Hussein RN  Activity Management: activity adjusted per tolerance  Taken 2/16/2023 0800 by Serena Hussein RN  Activity Management: activity adjusted per  tolerance  Goal: Optimal Comfort and Wellbeing  Outcome: Met  Goal: Readiness for Transition of Care  Outcome: Met     Problem: Fall Injury Risk  Goal: Absence of Fall and Fall-Related Injury  Outcome: Met  Intervention: Promote Injury-Free Environment  Recent Flowsheet Documentation  Taken 2/16/2023 1200 by Serena Hussein, RN  Safety Promotion/Fall Prevention:   activity supervised   assistive device/personal items within reach   clutter free environment maintained   fall prevention program maintained   nonskid shoes/slippers when out of bed   safety round/check completed  Taken 2/16/2023 1000 by Serena Hussein, RN  Safety Promotion/Fall Prevention:   activity supervised   assistive device/personal items within reach   clutter free environment maintained   fall prevention program maintained   nonskid shoes/slippers when out of bed   room organization consistent   safety round/check completed  Taken 2/16/2023 0800 by Serena Hussein, RN  Safety Promotion/Fall Prevention:   activity supervised   assistive device/personal items within reach   clutter free environment maintained   fall prevention program maintained   nonskid shoes/slippers when out of bed   room organization consistent   safety round/check completed     Problem: Hypertension Comorbidity  Goal: Blood Pressure in Desired Range  Outcome: Met   Goal Outcome Evaluation:

## 2023-02-16 NOTE — PLAN OF CARE
Goal Outcome Evaluation:           Progress: no change  Outcome Evaluation: Patient admitted from the ED at 2209. VSS on RA to 2LNC while sleeping. CIWA score is currently 6. No complaints of pain or nausea. Bruising noted on R flank and RUQ abd/breast area. Seizure and fall precautions in place. Plan for Bilateral Carotid Duplex today. Will continue with plan of care.

## 2023-02-16 NOTE — CASE MANAGEMENT/SOCIAL WORK
Case Management Discharge Note      Final Note: Mrs Mckeon resides alone in [facility].  She has a sister and daughter that live nearby that can assist as needed.  She is independent with ADL's and has access to multiple DME such as rolling walker, wheelchair, cane,  and raised toilet seat.  She states she has used these when she had prior orthopedic surgeries.  She is not current with home health or PT and her PCP is Nydia Guzmán.  She has discharge orders in place.  No needs identtifed at this time. Has transport home.                 Final Discharge Disposition Code: 01 - home or self-care

## 2023-02-16 NOTE — DISCHARGE SUMMARY
[facility] Medicine Services  DISCHARGE SUMMARY    Patient Name: Lizbeth Mckeon  : 1950  MRN: [id number]    Date of Admission: 2/15/2023  5:47 PM  Date of Discharge:  2023  Primary Care Physician: Nydia Guzmán MD    Consults     No orders found for last 30 day(s).          Hospital Course     Presenting Problem:   Hyponatremia [E87.1]    Active Hospital Problems    Diagnosis  POA   • **Hyponatremia [E87.1]  Yes   • Syncope [R55]  Yes   • Essential hypertension [I10]  Yes   • Mild depression [F32.A]  Yes   • Flank pain [R10.9]  Yes      Resolved Hospital Problems   No resolved problems to display.          Hospital Course:  Lizbeth Mckeon is a 72 y.o. female with PMH significant for atrial tachycardia s/p cardiac ablation, carotid artery disease, stroke s/p carotid endarterectomy, HTN, chronic hyponatremia, peripheral neuropathy, depression, who presents to the ED with complaint of right flank pain and right chest wall pain s/p fall from bed 4 days ago with question of syncope as well as hyponatremia.     Hyponatremia  - Corrected sodium 130 on admission  - History of mild hyponatremia, most recent sodium on 2023 135  - Reports increased water intake  - resolved today    Question of syncope  - Patient does not remember events of falling from bed, only waking up on floor  - History of carotid artery disease  - Carotid duplex done today with results PENDING at this time  -- no acute findings on CT A/P, suspect right sided pain is due to superficial injuries from fall   -CT head unremarkable     Hypertension  Hyperlipidemia  -  continue lisinopril and HCTZ upon d/c  -continue statin      Depression  - continue Lexapro     Alcohol use  - 1 bottle of wine nightly  - No history of withdrawal  - CIWA protocol while inpatient, no PRNs required  - Vitamin replacement      Discharge Follow Up Recommendations for outpatient labs/diagnostics:  Follow up with PCP within  one week     Day of Discharge     HPI:   No new issues overnight. Getting carotid duplex at bedside when I saw her.     Review of Systems  Gen- No fevers, chills  CV- No chest pain, palpitations  Resp- No cough, dyspnea  GI- No N/V/D, abd pain        Vital Signs:   Temp:  [98.1 °F (36.7 °C)-98.6 °F (37 °C)] 98.3 °F (36.8 °C)  Heart Rate:  [] 77  Resp:  [18-20] 18  BP: (116-191)/(57-94) 116/57  Flow (L/min):  [2] 2      Physical Exam:  Constitutional: No acute distress, awake, alert, [place]  HENT: NCAT, mucous membranes moist  Respiratory: Clear to auscultation bilaterally, respiratory effort normal   Cardiovascular: RRR, no murmurs, rubs, or gallops  Gastrointestinal: Positive bowel sounds, soft, nontender, nondistended  Musculoskeletal: No bilateral ankle edema  Psychiatric: Appropriate affect, cooperative  Neurologic: Oriented x 3, strength symmetric in all extremities, Cranial Nerves grossly intact to confrontation, speech clear  Skin: No rashes    Pertinent  and/or Most Recent Results     LAB RESULTS:      Lab 02/16/23  0334 02/15/23  1654   WBC 4.15 5.00   HEMOGLOBIN 12.0 13.1   HEMATOCRIT 36.3 38.8   PLATELETS 214 248   NEUTROS ABS 1.85 3.15   IMMATURE GRANS (ABS) 0.02 0.01   LYMPHS ABS 1.51 1.31   MONOS ABS 0.43 0.34   EOS ABS 0.32 0.16   MCV 96.3 95.3         Lab 02/16/23  0334 02/15/23  2249 02/15/23  1700 02/15/23  1654   SODIUM 133* 131*  --  128*   POTASSIUM 4.1 4.2  --  4.4   CHLORIDE 96* 93*  --  90*   CO2 29.0 29.0  --  28.0   ANION GAP 8.0 9.0  --  10.0   BUN 12 12  --  10   CREATININE 0.62 0.67 0.80 0.67   EGFR 93.6 91.8  --  91.8   GLUCOSE 105* 99  --  205*   CALCIUM 9.2 9.3  --  9.4   MAGNESIUM 2.0  --   --   --          Lab 02/15/23  1654   TOTAL PROTEIN 7.2   ALBUMIN 4.6   GLOBULIN 2.6   ALT (SGPT) 15   AST (SGOT) 23   BILIRUBIN 0.5   ALK PHOS 80         Lab 02/16/23  0056 02/15/23  2249 02/15/23  1654   PROBNP  --   --  149.3   HSTROP T 12* 11* 10*                 Brief Urine Lab Results   (Last result in the past 365 days)      Color   Clarity   Blood   Leuk Est   Nitrite   Protein   CREAT   Urine HCG        02/15/23 1710             77.8         02/15/23 1710 Yellow   Clear   Negative   Small (1+)   Negative   Negative               Microbiology Results (last 10 days)     ** No results found for the last 240 hours. **          CT Head Without Contrast    Result Date: 2/15/2023  CT HEAD WO CONTRAST Date of Exam: 2/15/2023 9:01 PM EST Indication: Syncope/presyncope, questionable cerebrovascular accident. Comparison: None available. Technique: Axial CT images were obtained of the head without contrast administration.  Reconstructed coronal and sagittal images were also obtained. Automated exposure control and iterative construction methods were used. Findings: There is mild volume loss. There is prominence of the sulci, fissures, and ventricles. The basal cisterns are well-maintained. There are areas of decreased density in the white matter tracts felt to represent chronic microvascular ischemia. There is no acute hemorrhage, midline shift, or suspicious extra-axial fluid collections. There is thinning of the lenses suggestive of previous cataract surgery. The visualized paranasal and mastoid sinuses are clear. The calvarium is unremarkable. There are atherosclerotic vascular calcifications within the cavernous carotid arteries and distal vertebral arteries.     Impression: 1. No acute findings. 2. Volume loss secondary to cerebral atrophy. 3. Chronic white matter microvascular ischemia. Electronically Signed: Norm Covington  2/15/2023 9:14 PM EST  Workstation ID: [id number]    CT Chest With Contrast Diagnostic    Result Date: 2/15/2023  CT CHEST W CONTRAST DIAGNOSTIC, CT ABDOMEN PELVIS W CONTRAST Date of Exam: 2/15/2023 5:57 PM EST Indication: Right chest pain, s/p fall 4-5 days ago. Comparison: None available. Technique: Axial CT images were obtained of the chest, abdomen and pelvis after the uneventful  intravenous administration of 90 mL Isovue-300.  Reconstructed coronal and sagittal images were also obtained. Automated exposure control and iterative construction methods were used. Findings: CT SCAN OF THE CHEST WITH IV CONTRAST: Mediastinal window images show no evidence of pericardial or pleural effusion, and no evidence of adenopathy. There is mild to moderate aortic valve calcification, moderate coronary artery calcification. No thoracic  aortic aneurysm or dissection is seen. Pulmonary arteries are relatively prominent, main pulmonary artery 3.2 cm in diameter, right pulmonary artery 2.7 cm in diameter and left pulmonary artery 2.6 cm, of only questionable significance. No pulmonary embolic disease is seen. Lungs appear clear of active disease. There is a calcified left upper lobe granuloma, and mild nonmasslike right apical scarring. Airways appear to be normally patent. Review of the study with attention to the bony structures shows no evidence of displaced rib fracture or chest wall hematoma. Nondisplaced rib fractures can be difficult to detect, even on CT scan, but none are identified on this study. No sternal fracture or thoracic vertebral compression deformity is seen.     No evidence of acute chest trauma or other active chest disease. CT SCAN THE ABDOMEN AND PELVIS WITH IV CONTRAST: There is mild diffuse fatty liver change. Gallbladder appears to be surgically absent and there is expected postcholecystectomy dilatation of common bile duct and minimal intrahepatic ductal dilatation. Spleen is not enlarged. No significant abnormalities are appreciated of the pancreas, adrenal glands, or left kidney. There is a 2 mm nonobstructing right upper pole renal calculus and minimal right upper pole renal cortical scarring. No upper abdominal free air, ascites, adenopathy, or acute inflammatory change is appreciated. The terminal ileum cecum and appendix lie in the right midabdomen and appear within normal  limits. Regarding lower abdomen pelvis, uterus and ovaries appear to be appropriately atrophic. Bladder is normally distended and normal in appearance. No free fluid or inflammatory change is seen. No evidence of abdominal wall hematoma or other trauma is appreciated. There is a severe compression deformity of L1, which appears to be chronic, as there is extensive buttressing osteophyte formation along the anterior margin of both disc spaces, and very smooth margins of the vertebral compression deformity.  There appears to only relatively mild bony narrowing of the canal at this level.  Impression: 1. No evidence of acute trauma or other acute abdominal or intrapelvic disease. 2. Nonobstructing 2 mm right upper pole renal calculus. 3. Incidentally noted old L1 vertebral compression deformity with mild spinal canal narrowing. Electronically Signed: Last Page  2/15/2023 6:42 PM EST  Workstation ID: [id number]    CT Abdomen Pelvis With Contrast    Result Date: 2/15/2023  CT CHEST W CONTRAST DIAGNOSTIC, CT ABDOMEN PELVIS W CONTRAST Date of Exam: 2/15/2023 5:57 PM EST Indication: Right chest pain, s/p fall 4-5 days ago. Comparison: None available. Technique: Axial CT images were obtained of the chest, abdomen and pelvis after the uneventful intravenous administration of 90 mL Isovue-300.  Reconstructed coronal and sagittal images were also obtained. Automated exposure control and iterative construction methods were used. Findings: CT SCAN OF THE CHEST WITH IV CONTRAST: Mediastinal window images show no evidence of pericardial or pleural effusion, and no evidence of adenopathy. There is mild to moderate aortic valve calcification, moderate coronary artery calcification. No thoracic  aortic aneurysm or dissection is seen. Pulmonary arteries are relatively prominent, main pulmonary artery 3.2 cm in diameter, right pulmonary artery 2.7 cm in diameter and left pulmonary artery 2.6 cm, of only questionable significance. No  pulmonary embolic disease is seen. Lungs appear clear of active disease. There is a calcified left upper lobe granuloma, and mild nonmasslike right apical scarring. Airways appear to be normally patent. Review of the study with attention to the bony structures shows no evidence of displaced rib fracture or chest wall hematoma. Nondisplaced rib fractures can be difficult to detect, even on CT scan, but none are identified on this study. No sternal fracture or thoracic vertebral compression deformity is seen.     No evidence of acute chest trauma or other active chest disease. CT SCAN THE ABDOMEN AND PELVIS WITH IV CONTRAST: There is mild diffuse fatty liver change. Gallbladder appears to be surgically absent and there is expected postcholecystectomy dilatation of common bile duct and minimal intrahepatic ductal dilatation. Spleen is not enlarged. No significant abnormalities are appreciated of the pancreas, adrenal glands, or left kidney. There is a 2 mm nonobstructing right upper pole renal calculus and minimal right upper pole renal cortical scarring. No upper abdominal free air, ascites, adenopathy, or acute inflammatory change is appreciated. The terminal ileum cecum and appendix lie in the right midabdomen and appear within normal limits. Regarding lower abdomen pelvis, uterus and ovaries appear to be appropriately atrophic. Bladder is normally distended and normal in appearance. No free fluid or inflammatory change is seen. No evidence of abdominal wall hematoma or other trauma is appreciated. There is a severe compression deformity of L1, which appears to be chronic, as there is extensive buttressing osteophyte formation along the anterior margin of both disc spaces, and very smooth margins of the vertebral compression deformity.  There appears to only relatively mild bony narrowing of the canal at this level.  Impression: 1. No evidence of acute trauma or other acute abdominal or intrapelvic disease. 2.  Nonobstructing 2 mm right upper pole renal calculus. 3. Incidentally noted old L1 vertebral compression deformity with mild spinal canal narrowing. Electronically Signed: Last Page  2/15/2023 6:42 PM EST  Workstation ID: [id number]                  Plan for Follow-up of Pending Labs/Results:     Discharge Details        Discharge Medications      ASK your doctor about these medications      Instructions Start Date   escitalopram 10 MG tablet  Commonly known as: LEXAPRO   1 tablet, Oral, Nightly      lisinopril-hydrochlorothiazide 20-12.5 MG per tablet  Commonly known as: PRINZIDE,ZESTORETIC   1 tablet, Oral, Nightly      melatonin 5 MG tablet tablet   1 tablet, Oral, Nightly      multivitamin with minerals tablet tablet   1 tablet, Oral, Nightly      simvastatin 40 MG tablet  Commonly known as: ZOCOR   1 tablet, Oral, Nightly      traZODone 50 MG tablet  Commonly known as: DESYREL   50 mg, Oral, Nightly             Allergies   Allergen Reactions   • Penicillins Hives   • Sulfa Antibiotics GI Intolerance         Discharge Disposition:      Diet:  Hospital:  Diet Order   Procedures   • Diet: Regular/House Diet; Texture: Regular Texture (IDDSI 7); Fluid Consistency: Thin (IDDSI 0)       Activity:      Restrictions or Other Recommendations:         CODE STATUS:    Code Status and Medical Interventions:   Ordered at: 02/15/23 2023     Code Status (Patient has no pulse and is not breathing):    CPR (Attempt to Resuscitate)     Medical Interventions (Patient has pulse or is breathing):    Full Support       No future appointments.              Vee Mota MD  02/16/23      Time Spent on Discharge:  I spent  25 minutes on this discharge activity which included: face-to-face encounter with the patient, reviewing the data in the system, coordination of the care with the nursing staff as well as consultants, documentation, and entering orders.

## 2023-02-18 LAB
QT INTERVAL: 322 MS
QTC INTERVAL: 423 MS

## 2023-09-06 ENCOUNTER — HOSPITAL ENCOUNTER (INPATIENT)
Facility: HOSPITAL | Age: 73
LOS: 3 days | Discharge: HOME | DRG: 309 | End: 2023-09-09
Attending: EMERGENCY MEDICINE | Admitting: STUDENT IN AN ORGANIZED HEALTH CARE EDUCATION/TRAINING PROGRAM
Payer: MEDICARE

## 2023-09-06 ENCOUNTER — APPOINTMENT (OUTPATIENT)
Dept: CT IMAGING | Facility: HOSPITAL | Age: 73
DRG: 309 | End: 2023-09-06
Payer: MEDICARE

## 2023-09-06 ENCOUNTER — APPOINTMENT (OUTPATIENT)
Dept: GENERAL RADIOLOGY | Facility: HOSPITAL | Age: 73
DRG: 309 | End: 2023-09-06
Payer: MEDICARE

## 2023-09-06 DIAGNOSIS — S00.03XA HEMATOMA OF SCALP, INITIAL ENCOUNTER: ICD-10-CM

## 2023-09-06 DIAGNOSIS — I48.91 ATRIAL FIBRILLATION WITH RAPID VENTRICULAR RESPONSE: Primary | ICD-10-CM

## 2023-09-06 DIAGNOSIS — F10.931 ACUTE HYPERACTIVE ALCOHOL WITHDRAWAL DELIRIUM: ICD-10-CM

## 2023-09-06 DIAGNOSIS — S09.90XA INJURY OF HEAD, INITIAL ENCOUNTER: ICD-10-CM
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
## 2023-09-06 DIAGNOSIS — I10 UNCONTROLLED HYPERTENSION: ICD-10-CM

## 2023-09-06 PROBLEM — F10.10 ALCOHOL ABUSE: Status: ACTIVE | Noted: 2023-09-06

## 2023-09-06 PROBLEM — R77.8 ELEVATED TROPONIN: Status: ACTIVE | Noted: 2023-09-06

## 2023-09-06 PROBLEM — G62.9 PERIPHERAL NEUROPATHY: Status: ACTIVE | Noted: 2023-09-06

## 2023-09-06 LAB
ALBUMIN SERPL-MCNC: 4 G/DL (ref 3.5–5.2)
ALBUMIN/GLOB SERPL: 1.4 G/DL
ALP SERPL-CCNC: 80 U/L (ref 39–117)
ALT SERPL W P-5'-P-CCNC: 16 U/L (ref 1–33)
ANION GAP SERPL CALCULATED.3IONS-SCNC: 13 MMOL/L (ref 5–15)
APTT PPP: 29.4 SECONDS (ref 60–90)
AST SERPL-CCNC: 23 U/L (ref 1–32)
BASOPHILS # BLD AUTO: 0.03 10*3/MM3 (ref 0–0.2)
BASOPHILS NFR BLD AUTO: 0.5 % (ref 0–1.5)
BILIRUB SERPL-MCNC: 0.5 MG/DL (ref 0–1.2)
BUN SERPL-MCNC: 13 MG/DL (ref 8–23)
BUN/CREAT SERPL: 20 (ref 7–25)
CALCIUM SPEC-SCNC: 9.3 MG/DL (ref 8.6–10.5)
CHLORIDE SERPL-SCNC: 97 MMOL/L (ref 98–107)
CO2 SERPL-SCNC: 24 MMOL/L (ref 22–29)
CREAT SERPL-MCNC: 0.65 MG/DL (ref 0.57–1)
DEPRECATED RDW RBC AUTO: 47.8 FL (ref 37–54)
EGFRCR SERPLBLD CKD-EPI 2021: 91.9 ML/MIN/1.73
EOSINOPHIL # BLD AUTO: 0.1 10*3/MM3 (ref 0–0.4)
EOSINOPHIL NFR BLD AUTO: 1.7 % (ref 0.3–6.2)
ERYTHROCYTE [DISTWIDTH] IN BLOOD BY AUTOMATED COUNT: 13.3 % (ref 12.3–15.4)
GLOBULIN UR ELPH-MCNC: 2.8 GM/DL
GLUCOSE SERPL-MCNC: 108 MG/DL (ref 65–99)
HCT VFR BLD AUTO: 38.6 % (ref 34–46.6)
HGB BLD-MCNC: 12.9 G/DL (ref 12–15.9)
HOLD SPECIMEN: NORMAL
IMM GRANULOCYTES # BLD AUTO: 0.01 10*3/MM3 (ref 0–0.05)
IMM GRANULOCYTES NFR BLD AUTO: 0.2 % (ref 0–0.5)
INR PPP: 1.04 (ref 0.89–1.12)
LYMPHOCYTES # BLD AUTO: 2.32 10*3/MM3 (ref 0.7–3.1)
LYMPHOCYTES NFR BLD AUTO: 39 % (ref 19.6–45.3)
MAGNESIUM SERPL-MCNC: 1.8 MG/DL (ref 1.6–2.4)
MCH RBC QN AUTO: 32.3 PG (ref 26.6–33)
MCHC RBC AUTO-ENTMCNC: 33.4 G/DL (ref 31.5–35.7)
MCV RBC AUTO: 96.5 FL (ref 79–97)
MONOCYTES # BLD AUTO: 0.42 10*3/MM3 (ref 0.1–0.9)
MONOCYTES NFR BLD AUTO: 7.1 % (ref 5–12)
NEUTROPHILS NFR BLD AUTO: 3.07 10*3/MM3 (ref 1.7–7)
NEUTROPHILS NFR BLD AUTO: 51.5 % (ref 42.7–76)
NRBC BLD AUTO-RTO: 0 /100 WBC (ref 0–0.2)
NT-PROBNP SERPL-MCNC: 1833 PG/ML (ref 0–1800)
PLATELET # BLD AUTO: 272 10*3/MM3 (ref 140–450)
PMV BLD AUTO: 8.5 FL (ref 6–12)
POTASSIUM SERPL-SCNC: 4.3 MMOL/L (ref 3.5–5.2)
PROT SERPL-MCNC: 6.8 G/DL (ref 6–8.5)
PROTHROMBIN TIME: 13.7 SECONDS (ref 12.2–14.5)
QT INTERVAL: 306 MS
QTC INTERVAL: 446 MS
RBC # BLD AUTO: 4 10*6/MM3 (ref 3.77–5.28)
SODIUM SERPL-SCNC: 134 MMOL/L (ref 136–145)
TROPONIN T SERPL HS-MCNC: 15 NG/L
TSH SERPL DL<=0.05 MIU/L-ACNC: 1.16 UIU/ML (ref 0.27–4.2)
UFH PPP CHRO-ACNC: 0.1 IU/ML (ref 0.3–0.7)
WBC NRBC COR # BLD: 5.95 10*3/MM3 (ref 3.4–10.8)
WHOLE BLOOD HOLD COAG: NORMAL
WHOLE BLOOD HOLD SPECIMEN: NORMAL

## 2023-09-06 PROCEDURE — 99285 EMERGENCY DEPT VISIT HI MDM: CPT

## 2023-09-06 PROCEDURE — 93005 ELECTROCARDIOGRAM TRACING: CPT | Performed by: EMERGENCY MEDICINE

## 2023-09-06 PROCEDURE — 83880 ASSAY OF NATRIURETIC PEPTIDE: CPT | Performed by: EMERGENCY MEDICINE

## 2023-09-06 PROCEDURE — 83735 ASSAY OF MAGNESIUM: CPT | Performed by: EMERGENCY MEDICINE

## 2023-09-06 PROCEDURE — 85025 COMPLETE CBC W/AUTO DIFF WBC: CPT | Performed by: EMERGENCY MEDICINE

## 2023-09-06 PROCEDURE — 70450 CT HEAD/BRAIN W/O DYE: CPT

## 2023-09-06 PROCEDURE — 93005 ELECTROCARDIOGRAM TRACING: CPT | Performed by: INTERNAL MEDICINE

## 2023-09-06 PROCEDURE — 84484 ASSAY OF TROPONIN QUANT: CPT | Performed by: EMERGENCY MEDICINE

## 2023-09-06 PROCEDURE — 84484 ASSAY OF TROPONIN QUANT: CPT | Performed by: INTERNAL MEDICINE

## 2023-09-06 PROCEDURE — 99223 1ST HOSP IP/OBS HIGH 75: CPT | Performed by: INTERNAL MEDICINE

## 2023-09-06 PROCEDURE — 25010000002 HEPARIN (PORCINE) PER 1000 UNITS: Performed by: EMERGENCY MEDICINE

## 2023-09-06 PROCEDURE — 85520 HEPARIN ASSAY: CPT | Performed by: EMERGENCY MEDICINE

## 2023-09-06 PROCEDURE — 25010000002 HEPARIN (PORCINE) 25000-0.45 UT/250ML-% SOLUTION: Performed by: EMERGENCY MEDICINE

## 2023-09-06 PROCEDURE — 80053 COMPREHEN METABOLIC PANEL: CPT | Performed by: EMERGENCY MEDICINE

## 2023-09-06 PROCEDURE — 85520 HEPARIN ASSAY: CPT | Performed by: INTERNAL MEDICINE

## 2023-09-06 PROCEDURE — 71045 X-RAY EXAM CHEST 1 VIEW: CPT

## 2023-09-06 PROCEDURE — 84443 ASSAY THYROID STIM HORMONE: CPT | Performed by: EMERGENCY MEDICINE

## 2023-09-06 PROCEDURE — 85610 PROTHROMBIN TIME: CPT | Performed by: EMERGENCY MEDICINE

## 2023-09-06 PROCEDURE — 85730 THROMBOPLASTIN TIME PARTIAL: CPT | Performed by: EMERGENCY MEDICINE

## 2023-09-06 RX ORDER — HEPARIN SODIUM 1000 [USP'U]/ML
2000 INJECTION, SOLUTION INTRAVENOUS; SUBCUTANEOUS AS NEEDED
Status: DISCONTINUED | OUTPATIENT
Start: 2023-09-06 | End: 2023-09-06

## 2023-09-06 RX ORDER — MULTIPLE VITAMINS W/ MINERALS TAB 9MG-400MCG
1 TAB ORAL DAILY
Status: DISCONTINUED | OUTPATIENT
Start: 2023-09-07 | End: 2023-09-09 | Stop reason: HOSPADM

## 2023-09-06 RX ORDER — LORAZEPAM 1 MG/1
1 TABLET ORAL
Status: DISCONTINUED | OUTPATIENT
Start: 2023-09-06 | End: 2023-09-09 | Stop reason: HOSPADM

## 2023-09-06 RX ORDER — LABETALOL HYDROCHLORIDE 5 MG/ML
10 INJECTION, SOLUTION INTRAVENOUS ONCE
Status: COMPLETED | OUTPATIENT
Start: 2023-09-06 | End: 2023-09-06

## 2023-09-06 RX ORDER — SODIUM CHLORIDE 0.9 % (FLUSH) 0.9 %
10 SYRINGE (ML) INJECTION AS NEEDED
Status: DISCONTINUED | OUTPATIENT
Start: 2023-09-06 | End: 2023-09-09 | Stop reason: HOSPADM

## 2023-09-06 RX ORDER — LISINOPRIL 20 MG/1
20 TABLET ORAL
Status: DISCONTINUED | OUTPATIENT
Start: 2023-09-07 | End: 2023-09-09 | Stop reason: HOSPADM

## 2023-09-06 RX ORDER — HEPARIN SODIUM 10000 [USP'U]/100ML
13.5 INJECTION, SOLUTION INTRAVENOUS
Status: DISCONTINUED | OUTPATIENT
Start: 2023-09-06 | End: 2023-09-09 | Stop reason: HOSPADM

## 2023-09-06 RX ORDER — HEPARIN SODIUM 1000 [USP'U]/ML
4000 INJECTION, SOLUTION INTRAVENOUS; SUBCUTANEOUS ONCE
Status: COMPLETED | OUTPATIENT
Start: 2023-09-06 | End: 2023-09-06

## 2023-09-06 RX ORDER — CHOLECALCIFEROL (VITAMIN D3) 125 MCG
5 CAPSULE ORAL NIGHTLY
Status: DISCONTINUED | OUTPATIENT
Start: 2023-09-07 | End: 2023-09-09 | Stop reason: HOSPADM

## 2023-09-06 RX ORDER — HEPARIN SODIUM 10000 [USP'U]/100ML
1000 INJECTION, SOLUTION INTRAVENOUS
Status: DISCONTINUED | OUTPATIENT
Start: 2023-09-07 | End: 2023-09-06 | Stop reason: SDUPTHER

## 2023-09-06 RX ORDER — SODIUM CHLORIDE 9 MG/ML
40 INJECTION, SOLUTION INTRAVENOUS AS NEEDED
Status: DISCONTINUED | OUTPATIENT
Start: 2023-09-06 | End: 2023-09-09 | Stop reason: HOSPADM

## 2023-09-06 RX ORDER — HEPARIN SODIUM 1000 [USP'U]/ML
4000 INJECTION, SOLUTION INTRAVENOUS; SUBCUTANEOUS AS NEEDED
Status: DISCONTINUED | OUTPATIENT
Start: 2023-09-06 | End: 2023-09-06

## 2023-09-06 RX ORDER — LORAZEPAM 1 MG/1
1 TABLET ORAL EVERY 6 HOURS
Status: DISCONTINUED | OUTPATIENT
Start: 2023-09-08 | End: 2023-09-08

## 2023-09-06 RX ORDER — LORAZEPAM 2 MG/ML
2 INJECTION INTRAMUSCULAR
Status: DISCONTINUED | OUTPATIENT
Start: 2023-09-06 | End: 2023-09-09 | Stop reason: HOSPADM

## 2023-09-06 RX ORDER — HEPARIN SODIUM 1000 [USP'U]/ML
4000 INJECTION, SOLUTION INTRAVENOUS; SUBCUTANEOUS AS NEEDED
Status: DISCONTINUED | OUTPATIENT
Start: 2023-09-06 | End: 2023-09-06 | Stop reason: SDUPTHER

## 2023-09-06 RX ORDER — LORAZEPAM 1 MG/1
2 TABLET ORAL EVERY 6 HOURS
Status: DISPENSED | OUTPATIENT
Start: 2023-09-07 | End: 2023-09-07

## 2023-09-06 RX ORDER — LORAZEPAM 1 MG/1
2 TABLET ORAL
Status: DISCONTINUED | OUTPATIENT
Start: 2023-09-06 | End: 2023-09-09 | Stop reason: HOSPADM

## 2023-09-06 RX ORDER — LORAZEPAM 2 MG/ML
1 INJECTION INTRAMUSCULAR
Status: DISCONTINUED | OUTPATIENT
Start: 2023-09-06 | End: 2023-09-09 | Stop reason: HOSPADM

## 2023-09-06 RX ORDER — ATORVASTATIN CALCIUM 20 MG/1
20 TABLET, FILM COATED ORAL NIGHTLY
Status: DISCONTINUED | OUTPATIENT
Start: 2023-09-06 | End: 2023-09-09 | Stop reason: HOSPADM

## 2023-09-06 RX ORDER — SODIUM CHLORIDE 0.9 % (FLUSH) 0.9 %
10 SYRINGE (ML) INJECTION EVERY 12 HOURS SCHEDULED
Status: DISCONTINUED | OUTPATIENT
Start: 2023-09-07 | End: 2023-09-09 | Stop reason: HOSPADM

## 2023-09-06 RX ORDER — FOLIC ACID 1 MG/1
1 TABLET ORAL DAILY
Status: DISCONTINUED | OUTPATIENT
Start: 2023-09-07 | End: 2023-09-09 | Stop reason: HOSPADM

## 2023-09-06 RX ORDER — HEPARIN SODIUM 1000 [USP'U]/ML
2000 INJECTION, SOLUTION INTRAVENOUS; SUBCUTANEOUS AS NEEDED
Status: DISCONTINUED | OUTPATIENT
Start: 2023-09-06 | End: 2023-09-06 | Stop reason: SDUPTHER

## 2023-09-06 RX ORDER — DILTIAZEM HYDROCHLORIDE 5 MG/ML
10 INJECTION INTRAVENOUS ONCE
Status: COMPLETED | OUTPATIENT
Start: 2023-09-06 | End: 2023-09-06

## 2023-09-06 RX ORDER — ESCITALOPRAM OXALATE 10 MG/1
10 TABLET ORAL NIGHTLY
Status: DISCONTINUED | OUTPATIENT
Start: 2023-09-07 | End: 2023-09-09 | Stop reason: HOSPADM

## 2023-09-06 RX ORDER — THIAMINE HYDROCHLORIDE 100 MG/ML
200 INJECTION, SOLUTION INTRAMUSCULAR; INTRAVENOUS EVERY 8 HOURS SCHEDULED
Status: DISCONTINUED | OUTPATIENT
Start: 2023-09-07 | End: 2023-09-09 | Stop reason: HOSPADM

## 2023-09-06 RX ORDER — HYDROCHLOROTHIAZIDE 12.5 MG/1
12.5 TABLET ORAL
Status: DISCONTINUED | OUTPATIENT
Start: 2023-09-07 | End: 2023-09-09 | Stop reason: HOSPADM

## 2023-09-06 RX ADMIN — HEPARIN SODIUM 10.5 UNITS/KG/HR: 10000 INJECTION, SOLUTION INTRAVENOUS at 21:42

## 2023-09-06 RX ADMIN — Medication 10 MG: at 23:11

## 2023-09-06 RX ADMIN — HEPARIN SODIUM 4000 UNITS: 1000 INJECTION INTRAVENOUS; SUBCUTANEOUS at 21:41

## 2023-09-06 RX ADMIN — DILTIAZEM HYDROCHLORIDE 10 MG: 5 INJECTION INTRAVENOUS at 21:45

## 2023-09-06 RX ADMIN — DILTIAZEM HYDROCHLORIDE 5 MG/HR: 5 INJECTION INTRAVENOUS at 21:46

## 2023-09-06 NOTE — Clinical Note
Level of Care: Telemetry [5]   Diagnosis: Atrial fibrillation with RVR [514731]   Admitting Physician: SCOOTER ALANIZ [[id number]]   Attending Physician: SCOOTER ALANIZ [[id number]]   Isolate for COVID?: No [0]   Bed Request Comments: tele   Certification: I Certify That Inpatient Hospital Services Are Medically Necessary For Greater Than 2 Midnights

## 2023-09-07 ENCOUNTER — APPOINTMENT (OUTPATIENT)
Dept: CARDIOLOGY | Facility: HOSPITAL | Age: 73
DRG: 309 | End: 2023-09-07
Payer: MEDICARE

## 2023-09-07 DIAGNOSIS — I48.91 ATRIAL FIBRILLATION WITH RVR: Primary | ICD-10-CM

## 2023-09-07 PROBLEM — Z86.73 HISTORY OF CVA (CEREBROVASCULAR ACCIDENT): Status: ACTIVE | Noted: 2023-09-07

## 2023-09-07 PROBLEM — R10.9 FLANK PAIN: Status: RESOLVED | Noted: 2023-02-15 | Resolved: 2023-09-07

## 2023-09-07 LAB
BASOPHILS # BLD AUTO: 0.03 10*3/MM3 (ref 0–0.2)
BASOPHILS NFR BLD AUTO: 0.6 % (ref 0–1.5)
BH CV ECHO MEAS - AO MAX PG: 8 MMHG
BH CV ECHO MEAS - AO MEAN PG: 4 MMHG
BH CV ECHO MEAS - AO ROOT DIAM: 3.2 CM
BH CV ECHO MEAS - AO V2 MAX: 141 CM/SEC
BH CV ECHO MEAS - AO V2 VTI: 27.9 CM
BH CV ECHO MEAS - AVA(I,D): 1.96 CM2
BH CV ECHO MEAS - EDV(CUBED): 79.5 ML
BH CV ECHO MEAS - EDV(MOD-SP2): 153 ML
BH CV ECHO MEAS - EDV(MOD-SP4): 125 ML
BH CV ECHO MEAS - EF(MOD-BP): 51.2 %
BH CV ECHO MEAS - EF(MOD-SP2): 48.9 %
BH CV ECHO MEAS - EF(MOD-SP4): 49.6 %
BH CV ECHO MEAS - ESV(CUBED): 29.8 ML
BH CV ECHO MEAS - ESV(MOD-SP2): 78.2 ML
BH CV ECHO MEAS - ESV(MOD-SP4): 63 ML
BH CV ECHO MEAS - FS: 27.9 %
BH CV ECHO MEAS - IVS/LVPW: 1.13 CM
BH CV ECHO MEAS - IVSD: 0.9 CM
BH CV ECHO MEAS - LA DIMENSION: 4.3 CM
BH CV ECHO MEAS - LAT PEAK E' VEL: 11.1 CM/SEC
BH CV ECHO MEAS - LV DIASTOLIC VOL/BSA (35-75): 60.5 CM2
BH CV ECHO MEAS - LV MASS(C)D: 114.2 GRAMS
BH CV ECHO MEAS - LV MAX PG: 2.07 MMHG
BH CV ECHO MEAS - LV MEAN PG: 1 MMHG
BH CV ECHO MEAS - LV SYSTOLIC VOL/BSA (12-30): 30.5 CM2
BH CV ECHO MEAS - LV V1 MAX: 71.9 CM/SEC
BH CV ECHO MEAS - LV V1 VTI: 17.4 CM
BH CV ECHO MEAS - LVIDD: 4.3 CM
BH CV ECHO MEAS - LVIDS: 3.1 CM
BH CV ECHO MEAS - LVOT AREA: 3.1 CM2
BH CV ECHO MEAS - LVOT DIAM: 2 CM
BH CV ECHO MEAS - LVPWD: 0.8 CM
BH CV ECHO MEAS - MED PEAK E' VEL: 9.5 CM/SEC
BH CV ECHO MEAS - MR MAX PG: 122.3 MMHG
BH CV ECHO MEAS - MR MAX VEL: 553 CM/SEC
BH CV ECHO MEAS - MR MEAN PG: 86 MMHG
BH CV ECHO MEAS - MR MEAN VEL: 455 CM/SEC
BH CV ECHO MEAS - MR VTI: 171 CM
BH CV ECHO MEAS - MV A MAX VEL: 36.2 CM/SEC
BH CV ECHO MEAS - MV DEC SLOPE: 875 CM/SEC2
BH CV ECHO MEAS - MV E MAX VEL: 113 CM/SEC
BH CV ECHO MEAS - MV E/A: 3.1
BH CV ECHO MEAS - MV MAX PG: 8.1 MMHG
BH CV ECHO MEAS - MV MEAN PG: 2.8 MMHG
BH CV ECHO MEAS - MV P1/2T: 53.2 MSEC
BH CV ECHO MEAS - MV V2 VTI: 22.1 CM
BH CV ECHO MEAS - MVA(P1/2T): 4.1 CM2
BH CV ECHO MEAS - MVA(VTI): 2.47 CM2
BH CV ECHO MEAS - PA ACC TIME: 0.11 SEC
BH CV ECHO MEAS - RAP SYSTOLE: 8 MMHG
BH CV ECHO MEAS - RVSP: 48 MMHG
BH CV ECHO MEAS - SI(MOD-SP2): 36.2 ML/M2
BH CV ECHO MEAS - SI(MOD-SP4): 30 ML/M2
BH CV ECHO MEAS - SV(LVOT): 54.7 ML
BH CV ECHO MEAS - SV(MOD-SP2): 74.8 ML
BH CV ECHO MEAS - SV(MOD-SP4): 62 ML
BH CV ECHO MEAS - TAPSE (>1.6): 1.46 CM
BH CV ECHO MEAS - TR MAX PG: 40 MMHG
BH CV ECHO MEAS - TR MAX VEL: 316 CM/SEC
BH CV ECHO MEASUREMENTS AVERAGE E/E' RATIO: 10.97
BH CV XLRA - RV BASE: 4.3 CM
BH CV XLRA - RV LENGTH: 6.3 CM
BH CV XLRA - RV MID: 3.5 CM
BH CV XLRA - TDI S': 11.7 CM/SEC
DEPRECATED RDW RBC AUTO: 46.9 FL (ref 37–54)
EOSINOPHIL # BLD AUTO: 0.14 10*3/MM3 (ref 0–0.4)
EOSINOPHIL NFR BLD AUTO: 2.6 % (ref 0.3–6.2)
ERYTHROCYTE [DISTWIDTH] IN BLOOD BY AUTOMATED COUNT: 13.3 % (ref 12.3–15.4)
GEN 5 2HR TROPONIN T REFLEX: 13 NG/L
HCT VFR BLD AUTO: 36.1 % (ref 34–46.6)
HGB BLD-MCNC: 12.1 G/DL (ref 12–15.9)
IMM GRANULOCYTES # BLD AUTO: 0 10*3/MM3 (ref 0–0.05)
IMM GRANULOCYTES NFR BLD AUTO: 0 % (ref 0–0.5)
LV EF 2D ECHO EST: 52 %
LYMPHOCYTES # BLD AUTO: 2.18 10*3/MM3 (ref 0.7–3.1)
LYMPHOCYTES NFR BLD AUTO: 40.3 % (ref 19.6–45.3)
MCH RBC QN AUTO: 32 PG (ref 26.6–33)
MCHC RBC AUTO-ENTMCNC: 33.5 G/DL (ref 31.5–35.7)
MCV RBC AUTO: 95.5 FL (ref 79–97)
MONOCYTES # BLD AUTO: 0.36 10*3/MM3 (ref 0.1–0.9)
MONOCYTES NFR BLD AUTO: 6.7 % (ref 5–12)
NEUTROPHILS NFR BLD AUTO: 2.7 10*3/MM3 (ref 1.7–7)
NEUTROPHILS NFR BLD AUTO: 49.8 % (ref 42.7–76)
NRBC BLD AUTO-RTO: 0 /100 WBC (ref 0–0.2)
PLATELET # BLD AUTO: 258 10*3/MM3 (ref 140–450)
PMV BLD AUTO: 8.9 FL (ref 6–12)
QT INTERVAL: 390 MS
QTC INTERVAL: 469 MS
RBC # BLD AUTO: 3.78 10*6/MM3 (ref 3.77–5.28)
TROPONIN T DELTA: -1 NG/L
TROPONIN T SERPL HS-MCNC: 14 NG/L
UFH PPP CHRO-ACNC: 0.25 IU/ML (ref 0.3–0.7)
UFH PPP CHRO-ACNC: 0.27 IU/ML (ref 0.3–0.7)
UFH PPP CHRO-ACNC: 0.37 IU/ML (ref 0.3–0.7)
UFH PPP CHRO-ACNC: 0.42 IU/ML (ref 0.3–0.7)
WBC NRBC COR # BLD: 5.41 10*3/MM3 (ref 3.4–10.8)

## 2023-09-07 PROCEDURE — 99222 1ST HOSP IP/OBS MODERATE 55: CPT | Performed by: INTERNAL MEDICINE

## 2023-09-07 PROCEDURE — 85520 HEPARIN ASSAY: CPT

## 2023-09-07 PROCEDURE — 25010000002 THIAMINE PER 100 MG: Performed by: INTERNAL MEDICINE

## 2023-09-07 PROCEDURE — 93306 TTE W/DOPPLER COMPLETE: CPT | Performed by: INTERNAL MEDICINE

## 2023-09-07 PROCEDURE — 93005 ELECTROCARDIOGRAM TRACING: CPT | Performed by: INTERNAL MEDICINE

## 2023-09-07 PROCEDURE — 25010000002 HEPARIN (PORCINE) 25000-0.45 UT/250ML-% SOLUTION

## 2023-09-07 PROCEDURE — 93306 TTE W/DOPPLER COMPLETE: CPT

## 2023-09-07 PROCEDURE — 97162 PT EVAL MOD COMPLEX 30 MIN: CPT

## 2023-09-07 PROCEDURE — 93010 ELECTROCARDIOGRAM REPORT: CPT | Performed by: STUDENT IN AN ORGANIZED HEALTH CARE EDUCATION/TRAINING PROGRAM

## 2023-09-07 PROCEDURE — 85025 COMPLETE CBC W/AUTO DIFF WBC: CPT | Performed by: EMERGENCY MEDICINE

## 2023-09-07 PROCEDURE — 99232 SBSQ HOSP IP/OBS MODERATE 35: CPT | Performed by: HOSPITALIST

## 2023-09-07 PROCEDURE — 84484 ASSAY OF TROPONIN QUANT: CPT | Performed by: INTERNAL MEDICINE

## 2023-09-07 PROCEDURE — 97165 OT EVAL LOW COMPLEX 30 MIN: CPT

## 2023-09-07 RX ORDER — VITAMIN A, ASCORBIC ACID, CHOLECALCIFEROL, ALPHA-TOCOPHEROL ACETATE, THIAMINE HYDROCHLORIDE, RIBOFLAVIN 5-PHOSPHATE SODIUM, NIACINAMIDE, PYRIDOXINE HYDROCHLORIDE, FERROUS SULFATE AND SODIUM FLUORIDE 1500; 35; 400; 5; .5; .6; 8; .4; 10; .25 [IU]/ML; MG/ML; [IU]/ML; [IU]/ML; MG/ML; MG/ML; MG/ML; MG/ML; MG/ML; MG/ML
LIQUID ORAL
COMMUNITY

## 2023-09-07 RX ORDER — LISINOPRIL 20 MG/1
1 TABLET ORAL DAILY
COMMUNITY

## 2023-09-07 RX ORDER — ASPIRIN 81 MG/1
81 TABLET ORAL DAILY
Status: DISCONTINUED | OUTPATIENT
Start: 2023-09-07 | End: 2023-09-09 | Stop reason: HOSPADM

## 2023-09-07 RX ORDER — ACETAMINOPHEN 325 MG/1
650 TABLET ORAL EVERY 6 HOURS PRN
Status: DISCONTINUED | OUTPATIENT
Start: 2023-09-07 | End: 2023-09-09 | Stop reason: HOSPADM

## 2023-09-07 RX ADMIN — Medication 10 ML: at 08:13

## 2023-09-07 RX ADMIN — Medication 10 ML: at 21:11

## 2023-09-07 RX ADMIN — METOPROLOL TARTRATE 25 MG: 25 TABLET, FILM COATED ORAL at 10:36

## 2023-09-07 RX ADMIN — ESCITALOPRAM OXALATE 10 MG: 10 TABLET ORAL at 21:09

## 2023-09-07 RX ADMIN — FOLIC ACID 1 MG: 1 TABLET ORAL at 08:12

## 2023-09-07 RX ADMIN — Medication 5 MG: at 00:53

## 2023-09-07 RX ADMIN — ESCITALOPRAM OXALATE 10 MG: 10 TABLET ORAL at 00:52

## 2023-09-07 RX ADMIN — THIAMINE HYDROCHLORIDE 200 MG: 100 INJECTION, SOLUTION INTRAMUSCULAR; INTRAVENOUS at 05:31

## 2023-09-07 RX ADMIN — HEPARIN SODIUM 12.5 UNITS/KG/HR: 10000 INJECTION, SOLUTION INTRAVENOUS at 21:05

## 2023-09-07 RX ADMIN — ATORVASTATIN CALCIUM 20 MG: 20 TABLET, FILM COATED ORAL at 20:54

## 2023-09-07 RX ADMIN — ASPIRIN 81 MG: 81 TABLET, COATED ORAL at 12:03

## 2023-09-07 RX ADMIN — ATORVASTATIN CALCIUM 20 MG: 20 TABLET, FILM COATED ORAL at 00:52

## 2023-09-07 RX ADMIN — ACETAMINOPHEN 650 MG: 325 TABLET ORAL at 00:51

## 2023-09-07 RX ADMIN — LORAZEPAM 2 MG: 1 TABLET ORAL at 17:58

## 2023-09-07 RX ADMIN — LORAZEPAM 2 MG: 1 TABLET ORAL at 12:03

## 2023-09-07 RX ADMIN — THIAMINE HYDROCHLORIDE 200 MG: 100 INJECTION, SOLUTION INTRAMUSCULAR; INTRAVENOUS at 00:52

## 2023-09-07 RX ADMIN — METOPROLOL TARTRATE 25 MG: 25 TABLET, FILM COATED ORAL at 20:54

## 2023-09-07 RX ADMIN — LISINOPRIL 20 MG: 20 TABLET ORAL at 08:12

## 2023-09-07 RX ADMIN — ACETAMINOPHEN 650 MG: 325 TABLET ORAL at 22:31

## 2023-09-07 RX ADMIN — LORAZEPAM 2 MG: 1 TABLET ORAL at 00:51

## 2023-09-07 RX ADMIN — MULTIPLE VITAMINS W/ MINERALS TAB 1 TABLET: TAB at 08:12

## 2023-09-07 RX ADMIN — THIAMINE HYDROCHLORIDE 200 MG: 100 INJECTION, SOLUTION INTRAMUSCULAR; INTRAVENOUS at 20:54

## 2023-09-07 RX ADMIN — Medication 10 ML: at 00:54

## 2023-09-07 RX ADMIN — THIAMINE HYDROCHLORIDE 200 MG: 100 INJECTION, SOLUTION INTRAMUSCULAR; INTRAVENOUS at 14:04

## 2023-09-07 RX ADMIN — Medication 5 MG: at 21:09

## 2023-09-07 NOTE — H&P
[facility] Medicine Services  HISTORY AND PHYSICAL    Patient Name: Lizbeth Mckeon  : 1950  MRN: [id number]  Primary Care Physician: Nydia Guzmán MD  Date of admission: 2023      Subjective   Subjective     Chief Complaint:   fall    HPI:  Lizbeth Mckeon is a 73 y.o. female with hx of HTN, chronic hyponatremia, peripheral neuropathy (pt notes no known etiology), frequent falls, CAD, stroke, atrial tachycardia (?SVT) s/p ablation  (in [place]), ETOH abuse, carotid artery anomaly s/p CEA here now w/ fall after taking out trash.  Pt states she just lost her footing and hit her head but did not lose consciousness.  Was somewhat disoriented immediately after but cleared quickly.  Initially had some visual changes but quickly resolved.  No n/v.  No chest pain or palpitations.  Has had shortness of breath over past 3 wks.  Has also had ble edema.  Has IBS so states she has chronic issues with diarrhea/constipation which have not changed. No urinary sx.  No f/c.         Review of Systems    Remainder reviewed and negative    Personal History     Past Medical History:   Diagnosis Date    Atrial tachycardia     Carotid artery disease     Depression     Hypertension     Hyponatremia     Peripheral neuropathy     Stroke (cerebrum)              Past Surgical History:   Procedure Laterality Date    CARDIAC ABLATION      CAROTID ENDARTERECTOMY Left     CHOLECYSTECTOMY      KNEE ARTHROPLASTY Bilateral     NISSEN FUNDOPLICATION      TONSILLECTOMY AND ADENOIDECTOMY         Family History: family history includes Cancer in her mother; Multiple sclerosis in her mother; Stroke in her father.     Social History:  reports that she quit smoking about 31 years ago. Her smoking use included cigarettes. She has never used smokeless tobacco. She reports current alcohol use of about 6.0 standard drinks per week. She reports that she does not use drugs.  Social History     Social History  Narrative    Not on file       Medications:  Available home medication information reviewed.  (Not in a hospital admission)      Allergies   Allergen Reactions    Penicillins Hives    Sulfa Antibiotics GI Intolerance       Objective   Objective     Vital Signs:   Temp:  [98.5 °F (36.9 °C)] 98.5 °F (36.9 °C)  Heart Rate:  [115-135] 115  Resp:  [20] 20  BP: (142-168)/(103-126) 165/123       Physical Exam    Gen; alert, oriented, nad  Heent; flushed cheeks, perrla, eomi, mmm  Cv; irir, no mrg  L; ctab,no wheeze/crackles  Abd; soft, +bs, ntnd, no r/g  Ext; trace ble pitting edema, no cc, dp pulses barely palpable  Skin; feet cool to touch, cdi  Neuro; grossly intact, no obvious deficits  Psych; mood and affect appropriate    Result Review:  I have personally reviewed the results from the time of this admission to 9/6/2023 22:31 EDT and agree with these findings:  [x]  Laboratory list / accordion  [x]  Microbiology  [x]  Radiology  [x]  EKG/Telemetry   []  Cardiology/Vascular   []  Pathology  []  Old records  []  Other:  Most notable findings include:          LAB RESULTS:      Lab 09/06/23 1935   WBC 5.95   HEMOGLOBIN 12.9   HEMATOCRIT 38.6   PLATELETS 272   NEUTROS ABS 3.07   IMMATURE GRANS (ABS) 0.01   LYMPHS ABS 2.32   MONOS ABS 0.42   EOS ABS 0.10   MCV 96.5   PROTIME 13.7   INR 1.04   APTT 29.4*   HEPARIN ANTI-XA 0.10*         Lab 09/06/23 1935   SODIUM 134*   POTASSIUM 4.3   CHLORIDE 97*   CO2 24.0   ANION GAP 13.0   BUN 13   CREATININE 0.65   EGFR 91.9   GLUCOSE 108*   CALCIUM 9.3   MAGNESIUM 1.8   TSH 1.160         Lab 09/06/23 1935   TOTAL PROTEIN 6.8   ALBUMIN 4.0   GLOBULIN 2.8   ALT (SGPT) 16   AST (SGOT) 23   BILIRUBIN 0.5   ALK PHOS 80         Lab 09/06/23 1935   PROBNP 1,833.0*   HSTROP T 15*                 UA          2/15/2023    17:10 7/24/2023    11:25   Urinalysis   Squamous Epithelial Cells, UA 0-2  0-2       Specific Gravity, UA 1.014  1.020       Ketones, UA Negative     Blood, UA Negative   Negative       Leukocytes, UA Small (1+)  Moderate       Nitrite, UA Negative     RBC, UA 3-6  <1       WBC, UA 0-2     Bacteria, UA None Seen  Present          Details          This result is from an external source.               Microbiology Results (last 10 days)       ** No results found for the last 240 hours. **            CT Head Without Contrast    Result Date: 9/6/2023  CT HEAD WO CONTRAST Date of Exam: 9/6/2023 9:20 PM EDT Indication: head injury. Comparison: None available. Technique: Axial CT images were obtained of the head without contrast administration.  Automated exposure control and iterative construction methods were used. Findings: There is a contusion at the right parietal scalp. No acute intracranial hemorrhage. No acute large territory infarct. There are mild scattered subcortical and periventricular white matter hypodensities which are nonspecific and can be seen in the setting  of chronic small vessel ischemic change. No extra-axial collections. No midline shift or herniation. Normal size and configuration of the ventricles. Normal appearance of the orbits. The partially imaged paranasal sinuses are clear. The mastoid air cells are clear. No acute calvarial fracture. No acute osseous findings.     Impression: Impression: Right parietal scalp contusion. No acute intracranial findings. Electronically Signed: Mike Patel MD  9/6/2023 9:30 PM EDT  Workstation ID: [id number]    XR Chest 1 View    Result Date: 9/6/2023  XR CHEST 1 VW Date of Exam: 9/6/2023 7:25 PM EDT Indication: Dysrhythmia triage protocol Comparison: None available. Findings:  There is no acute infiltrate. There is no large pleural effusion. The cardiac silhouette is unremarkable. The visualized negra structures demonstrate no abnormality.     Impression: Impression: No acute pulmonary disease. Electronically Signed: Osvaldo Pantoja MD  9/6/2023 7:50 PM EDT  Workstation ID: [id number]         Assessment & Plan   Assessment &  Plan     Active Hospital Problems    Diagnosis  POA    **Atrial fibrillation with RVR [I48.91]  Yes    Peripheral neuropathy [G62.9]  Yes    Falls [W19.XXXA]  Yes    Elevated troponin [R77.8]  Yes    Alcohol abuse [F10.10]  Yes    Hyponatremia [E87.1]  Yes    Essential hypertension [I10]  Yes    Mild depression [F32.A]  Yes     72 y/o female with hx of HTN, stroke, atrial tachycardia s/p ablation 2006, CEA for carotid artery anomaly, depression, alcohol abuse, peripheral neuropathy and recurrent falls here now w/  Falls  -likely multifactorial with neuropathy, etoh abuse, and now afib/rvr  -hct negative   2. Afib with RVR;  -continue cardizem gtt, hep gtt  -ECHO in a.m. and likely will need DONNY  -cardiology consult  -trend troponin  -bnp mildly elevated but cxr clear and not hypoxic  3. Hyponatremia, mild  -chronic  4. Alcohol abuse  -admits to 1 bottle wine per night but not hx of w/d  -ciwa protocol  -vitamins  5. Peripheral neuropathy  -suspect related to etoh abuse  -pt/ot eval      Electronically signed by Edel Elder MD, 09/06/23, 10:36 PM EDT.      DVT prophylaxis:   hep gtt      CODE STATUS:  full code  There are no questions and answers to display.       Expected Discharge  tbd  Expected discharge date/ time has not been documented.     Edel Elder MD  09/06/23

## 2023-09-07 NOTE — THERAPY EVALUATION
Patient Name: Lizbeth Mckeon  : 1950    MRN: [id number]                              Today's Date: 2023       Admit Date: 2023    Visit Dx:     ICD-10-CM ICD-9-CM   1. Atrial fibrillation with rapid ventricular response  I48.91 427.31   2. Fall, initial encounter  W19.XXXA E888.9   3. Injury of head, initial encounter  S09.90XA 959.01   4. Hematoma of scalp, initial encounter  S00.03XA 920   5. Uncontrolled hypertension  I10 401.9     Patient Active Problem List   Diagnosis    Syncope    Hyponatremia    Essential hypertension    Mild depression    Flank pain    Atrial fibrillation with RVR    Peripheral neuropathy    Falls    Elevated troponin    Alcohol abuse     Past Medical History:   Diagnosis Date    Atrial tachycardia     Carotid artery disease     Depression     Hypertension     Hyponatremia     Peripheral neuropathy     Stroke (cerebrum)      Past Surgical History:   Procedure Laterality Date    CARDIAC ABLATION      CAROTID ENDARTERECTOMY Left     CHOLECYSTECTOMY      KNEE ARTHROPLASTY Bilateral     NISSEN FUNDOPLICATION      TONSILLECTOMY AND ADENOIDECTOMY        General Information       Row Name 23 1012          OT Time and Intention    Document Type evaluation  -     Mode of Treatment occupational therapy  -       Row Name 23 1012          General Information    Patient Profile Reviewed yes  -     Prior Level of Function independent:;all household mobility;transfer;ADL's  -     Existing Precautions/Restrictions fall;seizures  -     Barriers to Rehab medically complex;impaired sensation  -       Row Name 23 1012          Living Environment    People in Home alone  -       Row Name 23 1012          Home Main Entrance    Number of Stairs, Main Entrance two  -     Stair Railings, Main Entrance railing on right side (ascending)  -       Row Name 23 1012          Stairs Within Home, Primary    Number of Stairs, Within Home, Primary none  -        Row Name 09/07/23 1012          Cognition    Orientation Status (Cognition) oriented x 3  -[facility] Name 09/07/23 1012          Safety Issues, Functional Mobility    Safety Issues Affecting Function (Mobility) awareness of need for assistance;safety precaution awareness;safety precautions follow-through/compliance;insight into deficits/self-awareness;sequencing abilities  -     Impairments Affecting Function (Mobility) balance;endurance/activity tolerance;pain;sensation/sensory awareness;strength  -               User Key  (r) = Recorded By, (t) = Taken By, (c) = Cosigned By      Initials Name Provider Type     Umm Kiser OT Occupational Therapist                     Mobility/ADL's       Row Name 09/07/23 1020          Bed Mobility    Bed Mobility supine-sit;scooting/bridging;sit-supine  -     Scooting/Bridging [place] (Bed Mobility) contact guard;verbal cues  -     Supine-Sit [place] (Bed Mobility) contact guard;verbal cues  -     Sit-Supine [place] (Bed Mobility) contact guard;verbal cues  -     Assistive Device (Bed Mobility) bed rails;head of bed elevated  -     Comment, (Bed Mobility) VC's to sequence transitioning from supine to sit EOB.  -[facility] Name 09/07/23 1020          Transfers    Transfers sit-stand transfer  -     Comment, (Transfers) VC's for hand placement and sequence  -[facility] Name 09/07/23 1020          Sit-Stand Transfer    Sit-Stand [place] (Transfers) verbal cues;minimum assist (75% patient effort)  -     Assistive Device (Sit-Stand Transfers) walker, front-wheeled  -[facility] Name 09/07/23 1020          Activities of Daily Living    BADL Assessment/Intervention lower body dressing;grooming  -[facility] Name 09/07/23 1020          Lower Body Dressing Assessment/Training    [place] Level (Lower Body Dressing) don;doff;socks;dependent (less than 25% patient effort)  -     Position (Lower Body Dressing) edge of bed sitting   -[facility] Name 09/07/23 1020          Grooming Assessment/Training    [place] Level (Grooming) wash face, hands;hair care, combing/brushing;set up  -     Position (Grooming) edge of bed sitting  -               User Key  (r) = Recorded By, (t) = Taken By, (c) = Cosigned By      Initials Name Provider Type     Umm Kiser OT Occupational Therapist                   Obj/Interventions       Row Name 09/07/23 1023          Sensory Assessment (Somatosensory)    Sensory Assessment (Somatosensory) UE sensation intact  -[facility] Name 09/07/23 1023          Vision Assessment/Intervention    Visual Impairment/Limitations corrective lenses for reading  -[facility] Name 09/07/23 1023          Range of Motion Comprehensive    General Range of Motion bilateral upper extremity ROM WFL  -[facility] Name 09/07/23 1023          Strength Comprehensive (MMT)    General Manual Muscle Testing (MMT) Assessment upper extremity strength deficits identified  -[facility] Name 09/07/23 1023          Upper Extremity (Manual Muscle Testing)    Upper Extremity: Manual Muscle Testing (MMT) right UE strength is WFL;left UE strength is WFL  -[facility] Name 09/07/23 1023          Motor Skills    Motor Skills functional endurance  -     Functional Endurance Decreased activity tolerance  -[facility] Name 09/07/23 1023          Balance    Balance Assessment sitting static balance;sitting dynamic balance;standing static balance;standing dynamic balance  -     Static Sitting Balance standby assist;verbal cues  -     Dynamic Sitting Balance contact guard;verbal cues  -     Position, Sitting Balance unsupported;sitting edge of bed  -     Static Standing Balance contact guard;verbal cues  -     Dynamic Standing Balance verbal cues;minimal assist  -     Position/Device Used, Standing Balance supported;walker, front-wheeled  -     Balance Interventions sitting;standing;sit to stand;supported;occupation  based/functional task;weight shifting activity  -     Comment, Balance Min A to steady  -               User Key  (r) = Recorded By, (t) = Taken By, (c) = Cosigned By      Initials Name Provider Type    Umm Kenny OT Occupational Therapist                   Goals/Plan       Row Name 09/07/23 1027          Transfer Goal 1 (OT)    Activity/Assistive Device (Transfer Goal 1, OT) sit-to-stand/stand-to-sit;bed-to-chair/chair-to-bed;walker, rolling  -     [place] Level/Cues Needed (Transfer Goal 1, OT) verbal cues required;contact guard required  -     Time Frame (Transfer Goal 1, OT) long term goal (LTG);by discharge  -     Progress/Outcome (Transfer Goal 1, OT) goal ongoing  -       Row Name 09/07/23 1027          Dressing Goal 1 (OT)    Activity/Device (Dressing Goal 1, OT) lower body dressing;long-handled shoe horn;sock-aid;reacher  -     [place]/Cues Needed (Dressing Goal 1, OT) minimum assist (75% or more patient effort);verbal cues required  -     Time Frame (Dressing Goal 1, OT) long term goal (LTG);by discharge  -     Progress/Outcome (Dressing Goal 1, OT) goal ongoing  -       Row Name 09/07/23 1027          Toileting Goal 1 (OT)    Activity/Device (Toileting Goal 1, OT) adjust/manage clothing;perform perineal hygiene;commode;grab bar/safety frame  -     [place] Level/Cues Needed (Toileting Goal 1, OT) contact guard required;verbal cues required  -     Time Frame (Toileting Goal 1, OT) long term goal (LTG);by discharge  -     Progress/Outcome (Toileting Goal 1, OT) goal ongoing  -               User Key  (r) = Recorded By, (t) = Taken By, (c) = Cosigned By      Initials Name Provider Type    Umm Kenny OT Occupational Therapist                   Clinical Impression       Row Name 09/07/23 1025          Pain Assessment    Pretreatment Pain Rating 6/10  -     Posttreatment Pain Rating 6/10  -     Pain Location generalized  -     Pain Location - head   -     Pain Intervention(s) Repositioned;Ambulation/increased activity;Nursing Notified  -     Additional Documentation Pain Scale: Word Pre/Post-Treatment (Group)  -       Row Name 09/07/23 1025          Plan of Care Review    Plan of Care Reviewed With patient  -     Progress no change  -     Outcome Evaluation Pt. presents below baseline with ADLs and functional mobility. Limited by impaired activity tolerance, generalized weakness, and balance. Skilled OT services warranted to promote return to PLOF. Recommend IPR at discharge.  -       Row Name 09/07/23 1025          Therapy Assessment/Plan (OT)    Patient/Family Therapy Goal Statement (OT) To return to PLOF  -     Therapy Frequency (OT) daily  -       Row Name 09/07/23 1025          Therapy Plan Review/Discharge Plan (OT)    Anticipated Discharge Disposition (OT) inpatient rehabilitation facility  -       Row Name 09/07/23 1025          Vital Signs    Pre Systolic BP Rehab 148  -LC     Pre Treatment Diastolic BP 95  -LC     Post Systolic BP Rehab 150  -LC     Post Treatment Diastolic   -LC     Pre Patient Position Supine  -     Intra Patient Position Standing  -     Post Patient Position Supine  -       Row Name 09/07/23 1025          Positioning and Restraints    Pre-Treatment Position in bed  -     Post Treatment Position bed  -LC     In Bed notified nsg;fowlers;call light within reach;encouraged to call for assist;exit alarm on;with other staff  -               User Key  (r) = Recorded By, (t) = Taken By, (c) = Cosigned By      Initials Name Provider Type     Umm Kiser, OT Occupational Therapist                   Outcome Measures       Row Name 09/07/23 1028          How much help from another is currently needed...    Putting on and taking off regular lower body clothing? 2  -LC     Bathing (including washing, rinsing, and drying) 2  -LC     Toileting (which includes using toilet bed pan or urinal) 2  -LC     Putting  on and taking off regular upper body clothing 4  -     Taking care of personal grooming (such as brushing teeth) 3  -LC     Eating meals 4  -     AM-PAC 6 Clicks Score (OT) 17  -LC       Row Name 09/07/23 1023 09/06/23 2345       How much help from another person do you currently need...    Turning from your back to your side while in flat bed without using bedrails? 3  - 4  -TD    Moving from lying on back to sitting on the side of a flat bed without bedrails? 3  - 4  -TD    Moving to and from a bed to a chair (including a wheelchair)? 3  - 4  -TD    Standing up from a chair using your arms (e.g., wheelchair, bedside chair)? 3  - 4  -TD    Climbing 3-5 steps with a railing? 2  - 3  -TD    To walk in hospital room? 3  - 3  -TD    AM-PAC 6 Clicks Score (PT) 17  - 22  -TD    Highest level of mobility 5 --> Static standing  - 7 --> Walked 25 feet or more  -TD      Row Name 09/07/23 1028 09/07/23 1023       Functional Assessment    Outcome Measure Options AM-PAC 6 Clicks Daily Activity (OT)  - AM-PAC 6 Clicks Basic Mobility (PT)  -              User Key  (r) = Recorded By, (t) = Taken By, (c) = Cosigned By      Initials Name Provider Type     Umm Kiser, HANNAH Occupational Therapist    Ana Rojo RN Registered Nurse     Kaykay Terry, PT Physical Therapist                    Occupational Therapy Education       Title: PT OT SLP Therapies (In Progress)       Topic: Occupational Therapy (In Progress)       Point: ADL training (In Progress)       Description:   Instruct learner(s) on proper safety adaptation and remediation techniques during self care or transfers.   Instruct in proper use of assistive devices.                  Learning Progress Summary             Patient Acceptance, E, NR by  at 9/7/2023 0817                         Point: Home exercise program (Not Started)       Description:   Instruct learner(s) on appropriate technique for monitoring, assisting and/or  progressing therapeutic exercises/activities.                  Learner Progress:  Not documented in this visit.              Point: Precautions (In Progress)       Description:   Instruct learner(s) on prescribed precautions during self-care and functional transfers.                  Learning Progress Summary             Patient Acceptance, E, NR by  at 9/7/2023 0849                         Point: Body mechanics (In Progress)       Description:   Instruct learner(s) on proper positioning and spine alignment during self-care, functional mobility activities and/or exercises.                  Learning Progress Summary             Patient Acceptance, E, NR by  at 9/7/2023 0849                                         User Key       Initials Effective Dates Name Provider Type Discipline     06/16/21 -  Umm Kiser, HANNAH Occupational Therapist OT                  OT Recommendation and Plan  Therapy Frequency (OT): daily  Plan of Care Review  Plan of Care Reviewed With: patient  Progress: no change  Outcome Evaluation: Pt. presents below baseline with ADLs and functional mobility. Limited by impaired activity tolerance, generalized weakness, and balance. Skilled OT services warranted to promote return to PLOF. Recommend IPR at discharge.     Time Calculation:   Evaluation Complexity (OT)  Review Occupational Profile/Medical/Therapy History Complexity: brief/low complexity  Assessment, Occupational Performance/Identification of Deficit Complexity: 1-3 performance deficits  Clinical Decision Making Complexity (OT): problem focused assessment/low complexity  Overall Complexity of Evaluation (OT): low complexity     Time Calculation- OT       Row Name 09/07/23 1029             Time Calculation- OT    OT Start Time 0849  -      OT Received On 09/07/23  -      OT Goal Re-Cert Due Date 09/17/23  -         Untimed Charges    OT Eval/Re-eval Minutes 53  -         Total Minutes    Untimed Charges Total Minutes 53  -        Total Minutes 53  -LC                User Key  (r) = Recorded By, (t) = Taken By, (c) = Cosigned By      Initials Name Provider Type    Umm Kenny OT Occupational Therapist                  Therapy Charges for Today       Code Description Service Date Service Provider Modifiers Qty    [id number]  OT EVAL LOW COMPLEXITY 4 9/7/2023 Umm Kiser OT GO 1                 Umm Kiser OT  9/7/2023

## 2023-09-07 NOTE — PROGRESS NOTES
[facility] INFUSION  Lizbeth Mckeon is a  73 y.o. female receiving heparin infusion.     Therapy for (VTE/Cardiac): Cardiac  Patient Weight: 93.4 kg  Initial Bolus (Y/N): Y  Any Bolus (Y/N): Y        Signs or Symptoms of Bleeding:     Cardiac or Other (Not VTE)   Initial Bolus: 60 units/kg (Max 4,000 units)  Initial rate: 12 units/kg/hr (Max 1,000 units/hr)   Anti Xa Rebolus Infusion Hold time Change infusion Dose (Units/kg/hr) Next Anti Xa or aPTT Level Due   < 0.11 50 Units/kg  (4000 Units Max) None Increase by  3 Units/kg/hr 6 hours   0.11- 0.19 25 Units/kg  (2000 Units Max) None Increase by  2 Units/kg/hr 6 hours   0.2 - 0.29 0 None Increase by  1 Units/kg/hr 6 hours   0.3 - 0.5 0 None No Change 6 hours (after 2 consecutive levels in range check qAM)   0.51 - 0.6 0 None Decrease by  1 Units/kg/hr 6 hours   0.61 - 0.8 0 30 Minutes Decrease by  2 Units/kg/hr 6 hours   0.81 - 1 0 60 Minutes Decrease by  3 Units/kg/hr 6 hours   >1 0 Hold  After Anti Xa less than 0.5 decrease previous rate by  4 Units/kg/hr  Every 2 hours until Anti Xa  less than 0.5 then when infusion restarts in 6 hours       Results from last 7 days   Lab Units 09/06/23  1935   INR  1.04   HEMOGLOBIN g/dL 12.9   HEMATOCRIT % 38.6   PLATELETS 10*3/mm3 272          Date   Time   Anti-Xa Current Rate (Unit/kg/hr) Bolus   (Units) Rate Change   (Unit/kg/hr) New Rate (Unit/kg/hr) Next   Anti-Xa Comments  Pump Check Daily   9/6 2100 0.1 0 4000 +10.5 10.5 0400 DW RN, pump verified                                                                                                                                                                                                                                 Mike Meraz, PharmD  9/6/2023  22:01 EDT

## 2023-09-07 NOTE — CASE MANAGEMENT/SOCIAL WORK
Discharge Planning Assessment  [facility]     Patient Name: Lizbeth Mckeon  MRN: [id number]  Today's Date: 9/7/2023    Admit Date: 9/6/2023    Plan: HOME   Discharge Needs Assessment       Row Name 09/07/23 1226       Living Environment    People in Home alone    Unique Family Situation Brother stays with her occasionally    Current Living Arrangements home    Primary Care Provided by self    Provides Primary Care For no one    Quality of Family Relationships helpful;supportive    Able to Return to Prior Arrangements yes       Transition Planning    Patient/Family Anticipates Transition to home    Patient/Family Anticipated Services at Transition     Transportation Anticipated family or friend will provide       Discharge Needs Assessment    Readmission Within the Last 30 Days no previous admission in last 30 days    Equipment Currently Used at Home grab bar;walker, rolling;cane, straight    Concerns to be Addressed discharge planning    Anticipated Changes Related to Illness none    Equipment Needed After Discharge none    Outpatient/Agency/Support Group Needs outpatient therapy    Discharge Facility/Level of Care Needs outpatient therapy    Current Discharge Risk chronically ill                   Discharge Plan       Row Name 09/07/23 1227       Plan    Plan HOME    Patient/Family in Agreement with Plan yes    Plan Comments Met with pt at bedside for DCP.  She resides alone in [facility].  Her brother occasionally stays with her.  She is independent with ADLs.  Has been using a cane or RW lately.  Currently goes to OP PT at [facility] 2x/week.  Confirmed she has Medicare and [employer] insurance with Rx coverage.  Goal is to return home upon DC.  No immediate needs identified/voiced.  CM will cont to follow.    Final Discharge Disposition Code 01 - home or self-care                  Continued Care and Services - Admitted Since 9/6/2023    Coordination has not been started for this encounter.       Expected  Discharge Date and Time       Expected Discharge Date Expected Discharge Time    Sep 9, 2023            Demographic Summary       Row Name 09/07/23 1225       General Information    Admission Type inpatient    Referral Source admission list    Reason for Consult discharge planning    Preferred Language English       Contact Information    Permission Granted to Share Info With     Contact Information Obtained for                    Functional Status       Row Name 09/07/23 1225       Functional Status    Usual Activity Tolerance moderate    Current Activity Tolerance moderate       Functional Status, IADL    Medications independent    Meal Preparation independent    Housekeeping independent    Laundry independent    Shopping independent                   Psychosocial    No documentation.                  Abuse/Neglect    No documentation.                  Legal    No documentation.                  Substance Abuse    No documentation.                  Patient Forms    No documentation.                     Harmony Menendez RN

## 2023-09-07 NOTE — PROGRESS NOTES
[facility] INFUSION  Lizbeth Mckeon is a  73 y.o. female receiving heparin infusion.     Therapy for (VTE/Cardiac): Cardiac  Patient Weight: 93.4 kg  Initial Bolus (Y/N): Y  Any Bolus (Y/N): Y        Signs or Symptoms of Bleeding: None noted    Cardiac or Other (Not VTE)   Initial Bolus: 60 units/kg (Max 4,000 units)  Initial rate: 12 units/kg/hr (Max 1,000 units/hr)   Anti Xa Rebolus Infusion Hold time Change infusion Dose (Units/kg/hr) Next Anti Xa or aPTT Level Due   < 0.11 50 Units/kg  (4000 Units Max) None Increase by  3 Units/kg/hr 6 hours   0.11- 0.19 25 Units/kg  (2000 Units Max) None Increase by  2 Units/kg/hr 6 hours   0.2 - 0.29 0 None Increase by  1 Units/kg/hr 6 hours   0.3 - 0.5 0 None No Change 6 hours (after 2 consecutive levels in range check qAM)   0.51 - 0.6 0 None Decrease by  1 Units/kg/hr 6 hours   0.61 - 0.8 0 30 Minutes Decrease by  2 Units/kg/hr 6 hours   0.81 - 1 0 60 Minutes Decrease by  3 Units/kg/hr 6 hours   >1 0 Hold  After Anti Xa less than 0.5 decrease previous rate by  4 Units/kg/hr  Every 2 hours until Anti Xa  less than 0.5 then when infusion restarts in 6 hours       Results from last 7 days   Lab Units 09/07/23  0403 09/06/23  1935   INR   --  1.04   HEMOGLOBIN g/dL 12.1 12.9   HEMATOCRIT % 36.1 38.6   PLATELETS 10*3/mm3 258 272          Date   Time   Anti-Xa Current Rate (Unit/kg/hr) Bolus   (Units) Rate Change   (Unit/kg/hr) New Rate (Unit/kg/hr) Next   Anti-Xa Comments  Pump Check Daily   9/6 2100 0.1 0 4000 +10.5 10.5 0400 DW RN, pump verified   9/7 0403 0.27 10.5 -- +1 11.5 1100 D/w RN   9/7 1111 0.37 11.5 -- 0 11.5 1800 D/W RN. Pump checked                                                                                                                                                                                                            Harvey Camacho [facility]  9/7/2023  12:09 EDT

## 2023-09-07 NOTE — PROGRESS NOTES
[facility] Medicine Services  PROGRESS NOTE    Patient Name: Lizbeth Mckeon  : 1950  MRN: [id number]    Date of Admission: 2023  Primary Care Physician: Nydia Guzmán MD    Subjective   Subjective     CC:  F/U fall    HPI:  Patient seen this morning. Echo tech at bedside. She reports a headache. Otherwise no complaints.     ROS:  Gen-no fevers, no chills  CV-no chest pain, no palpitations  Resp-no cough, no dyspnea  GI-no N/V/D, no abd pain       Objective   Objective     Vital Signs:   Temp:  [98.2 °F (36.8 °C)-98.7 °F (37.1 °C)] 98.7 °F (37.1 °C)  Heart Rate:  [] 78  Resp:  [18-20] 18  BP: (111-171)/() 111/60     Physical Exam:  Gen-no acute distress  HENT-NCAT, mucous membranes moist  CV-irregularly irregular, S1 S2 normal, no m/r/g  Resp-CTAB, no wheezes or rales  Abd-soft, NT, ND, +BS  Ext-no edema  Neuro-A&Ox3, no focal deficits  Skin-no rashes  Psych-appropriate mood      Results Reviewed:  LAB RESULTS:      Lab 23  1111 23  0403 23  2352 23   WBC  --  5.41  --  5.95   HEMOGLOBIN  --  12.1  --  12.9   HEMATOCRIT  --  36.1  --  38.6   PLATELETS  --  258  --  272   NEUTROS ABS  --  2.70  --  3.07   IMMATURE GRANS (ABS)  --  0.00  --  0.01   LYMPHS ABS  --  2.18  --  2.32   MONOS ABS  --  0.36  --  0.42   EOS ABS  --  0.14  --  0.10   MCV  --  95.5  --  96.5   PROTIME  --   --   --  13.7   APTT  --   --   --  29.4*   HEPARIN ANTI-XA 0.37 0.27* 0.42 0.10*         Lab 23   SODIUM 134*   POTASSIUM 4.3   CHLORIDE 97*   CO2 24.0   ANION GAP 13.0   BUN 13   CREATININE 0.65   EGFR 91.9   GLUCOSE 108*   CALCIUM 9.3   MAGNESIUM 1.8   TSH 1.160         Lab 23   TOTAL PROTEIN 6.8   ALBUMIN 4.0   GLOBULIN 2.8   ALT (SGPT) 16   AST (SGOT) 23   BILIRUBIN 0.5   ALK PHOS 80         Lab 23  0159 23  2352 23   PROBNP  --   --  1,833.0*   HSTROP T 13* 14* 15*   PROTIME  --   --  13.7   INR   --   --  1.04                 Brief Urine Lab Results  (Last result in the past 365 days)        Color   Clarity   Blood   Leuk Est   Nitrite   Protein   CREAT   Urine HCG        07/24/23 1125 Yellow   Clear     Moderate                       Microbiology Results Abnormal       None            Adult Transthoracic Echo Complete With Contrast if Necessary Per Protocol    Result Date: 9/7/2023    Left ventricular systolic function is normal. Estimated left ventricular EF = 52%   The left atrial cavity is moderately dilated.   Mild to moderate mitral regurgitation is present   Estimated right ventricular systolic pressure from tricuspid regurgitation is moderately elevated (48 mmHg).     CT Head Without Contrast    Result Date: 9/6/2023  CT HEAD WO CONTRAST Date of Exam: 9/6/2023 9:20 PM EDT Indication: head injury. Comparison: None available. Technique: Axial CT images were obtained of the head without contrast administration.  Automated exposure control and iterative construction methods were used. Findings: There is a contusion at the right parietal scalp. No acute intracranial hemorrhage. No acute large territory infarct. There are mild scattered subcortical and periventricular white matter hypodensities which are nonspecific and can be seen in the setting  of chronic small vessel ischemic change. No extra-axial collections. No midline shift or herniation. Normal size and configuration of the ventricles. Normal appearance of the orbits. The partially imaged paranasal sinuses are clear. The mastoid air cells are clear. No acute calvarial fracture. No acute osseous findings.     Impression: Impression: Right parietal scalp contusion. No acute intracranial findings. Electronically Signed: Mike Patel MD  9/6/2023 9:30 PM EDT  Workstation ID: [id number]    XR Chest 1 View    Result Date: 9/6/2023  XR CHEST 1 VW Date of Exam: 9/6/2023 7:25 PM EDT Indication: Dysrhythmia triage protocol Comparison: None available.  Findings:  There is no acute infiltrate. There is no large pleural effusion. The cardiac silhouette is unremarkable. The visualized negra structures demonstrate no abnormality.     Impression: Impression: No acute pulmonary disease. Electronically Signed: Osvaldo Pantoja MD  9/6/2023 7:50 PM EDT  Workstation ID: [id number]     Results for orders placed during the hospital encounter of 09/06/23    Adult Transthoracic Echo Complete With Contrast if Necessary Per Protocol    Interpretation Summary    Left ventricular systolic function is normal. Estimated left ventricular EF = 52%    The left atrial cavity is moderately dilated.    Mild to moderate mitral regurgitation is present    Estimated right ventricular systolic pressure from tricuspid regurgitation is moderately elevated (48 mmHg).      Current medications:  Scheduled Meds:atorvastatin, 20 mg, Oral, Nightly  escitalopram, 10 mg, Oral, Nightly  folic acid, 1 mg, Oral, Daily  lisinopril, 20 mg, Oral, Q24H   And  hydroCHLOROthiazide, 12.5 mg, Oral, Q24H  LORazepam, 2 mg, Oral, Q6H   Followed by  [START ON 9/8/2023] LORazepam, 1 mg, Oral, Q6H  melatonin, 5 mg, Oral, Nightly  metoprolol tartrate, 25 mg, Oral, Q12H  multivitamin with minerals, 1 tablet, Oral, Daily  sodium chloride, 10 mL, Intravenous, Q12H  thiamine (B-1) IV, 200 mg, Intravenous, Q8H   Followed by  [START ON 9/12/2023] thiamine, 100 mg, Oral, Daily      Continuous Infusions:dilTIAZem, 5-15 mg/hr, Last Rate: 5 mg/hr (09/06/23 2146)  heparin, 11.5 Units/kg/hr, Last Rate: 11.5 Units/kg/hr (09/07/23 0534)  Pharmacy to Dose Heparin,       PRN Meds:.  acetaminophen    LORazepam **OR** LORazepam **OR** LORazepam **OR** LORazepam **OR** LORazepam **OR** LORazepam    Pharmacy to Dose Heparin    sodium chloride    sodium chloride    sodium chloride    Assessment & Plan   Assessment & Plan     Active Hospital Problems    Diagnosis  POA    **Atrial fibrillation with RVR [I48.91]  Yes    History of CVA  (cerebrovascular accident) [Z86.73]  Not Applicable    Peripheral neuropathy [G62.9]  Yes    Falls [W19.XXXA]  Yes    Elevated troponin [R77.8]  Yes    Alcohol abuse [F10.10]  Yes    Hyponatremia [E87.1]  Yes    Essential hypertension [I10]  Yes    Mild depression [F32.A]  Yes      Resolved Hospital Problems   No resolved problems to display.        Brief Hospital Course to date:  Lizbeth Mckeon is a 73 y.o. female with hx of HTN, CAD, CVA, atrial tachycardia s/p ablation in 2006, chronic hyponatremia, peripheral neuropathy, carotid artery anomaly s/p CEA, and alcohol abuse who presents to the ER following a fall while taking out the trash. She hit her head but did not lose consciousness. Reports SOA over the past 3 weeks. She is found to be in Afib with RVR.     Afib with RVR  Hx of atrial tachycardia (s/p ablation in 2006 in [place])  --Continue Cardizem drip and heparin drip  --Cardiology consulted: started Metoprolol 25 mg BID, recommend ASA, start Eliquis 1 week after discharge, and F/U with EP Cardiology as outpatient in a couple of weeks for Watchman evaluation as patient is not the best candidate for ongoing anticoagulation given hx of falls  --Echo with EF 52%, LV diastolic dysfunction noted, left atrial cavity moderately dilated, mild to moderate MR, moderately elevated RVSP from TR (48 mmHg)  --Rates better controlled currently, monitor    Falls  Head injury  --CT head no acute findings other than right parietal scalp contusion  --PT/OT evaluation  --Family reports patient falls multiple times per month    Hyponatremia, chronic  --Monitor    HTN  CAD  --Continue home Lisinopril; Metoprolol added as above  --Continue home statin    Alcohol abuse  --Reports 1 bottle of wine nightly  --CIWA protocol with scheduled and PRN Ativan  --Thiamine, folic acid, multivitamin    Peripheral neuropathy  --Suspect due to alcohol use?  --PT/OT evaluation    Expected Discharge Location and Transportation: home vs  rehab  Expected Discharge   Expected Discharge Date: 9/9/2023; Expected Discharge Time:      DVT prophylaxis:  Medical DVT prophylaxis orders are present.     AM-PAC 6 Clicks Score (PT): 17 (09/07/23 1023)    CODE STATUS:   Code Status and Medical Interventions:   Ordered at: 09/06/23 8183     Code Status (Patient has no pulse and is not breathing):    CPR (Attempt to Resuscitate)     Medical Interventions (Patient has pulse or is breathing):    Full Support       Darlene Arredondo MD  09/07/23

## 2023-09-07 NOTE — PLAN OF CARE
Goal Outcome Evaluation:      PT was in Afib on the monitor. Pt was stable on monitor.

## 2023-09-07 NOTE — THERAPY EVALUATION
Patient Name: Lizbeth Mckeon  : 1950    MRN: [id number]                              Today's Date: 2023       Admit Date: 2023    Visit Dx:     ICD-10-CM ICD-9-CM   1. Atrial fibrillation with rapid ventricular response  I48.91 427.31   2. Fall, initial encounter  W19.XXXA E888.9   3. Injury of head, initial encounter  S09.90XA 959.01   4. Hematoma of scalp, initial encounter  S00.03XA 920   5. Uncontrolled hypertension  I10 401.9     Patient Active Problem List   Diagnosis    Syncope    Hyponatremia    Essential hypertension    Mild depression    Flank pain    Atrial fibrillation with RVR    Peripheral neuropathy    Falls    Elevated troponin    Alcohol abuse     Past Medical History:   Diagnosis Date    Atrial tachycardia     Carotid artery disease     Depression     Hypertension     Hyponatremia     Peripheral neuropathy     Stroke (cerebrum)      Past Surgical History:   Procedure Laterality Date    CARDIAC ABLATION      CAROTID ENDARTERECTOMY Left     CHOLECYSTECTOMY      KNEE ARTHROPLASTY Bilateral     NISSEN FUNDOPLICATION      TONSILLECTOMY AND ADENOIDECTOMY        General Information       Row Name 23 1004          Physical Therapy Time and Intention    Document Type evaluation  -     Mode of Treatment physical therapy  -       Row Name 23 1004          General Information    Patient Profile Reviewed yes  -     Prior Level of Function independent:;all household mobility;community mobility;gait;transfer;bed mobility  frequent falls, has been using FWx as of recent  -     Existing Precautions/Restrictions fall;other (see comments)  monitor BP  -     Barriers to Rehab medically complex;impaired sensation  -       Row Name 23 1004          Living Environment    People in Home alone  -       Row Name 23 1004          Home Main Entrance    Number of Stairs, Main Entrance two  -     Stair Railings, Main Entrance railing on right side (ascending)  -       Row  Name 09/07/23 1004          Stairs Within Home, Primary    Number of Stairs, Within Home, Primary none  -HM       Row Name 09/07/23 1004          Cognition    Orientation Status (Cognition) oriented x 3  -HM       Row Name 09/07/23 1004          Safety Issues, Functional Mobility    Safety Issues Affecting Function (Mobility) awareness of need for assistance;safety precaution awareness;safety precautions follow-through/compliance;insight into deficits/self-awareness;sequencing abilities  -     Impairments Affecting Function (Mobility) balance;endurance/activity tolerance;strength;pain;sensation/sensory awareness  -               User Key  (r) = Recorded By, (t) = Taken By, (c) = Cosigned By      Initials Name Provider Type     Kaykay Terry, PT Physical Therapist                   Mobility       Row Name 09/07/23 1007          Bed Mobility    Bed Mobility supine-sit  -     Supine-Sit [place] (Bed Mobility) contact guard;1 person assist  -     Assistive Device (Bed Mobility) bed rails;head of bed elevated  -     Comment, (Bed Mobility) increased time to sit EOB  -       Row Name 09/07/23 1007          Sit-Stand Transfer    Sit-Stand [place] (Transfers) 1 person assist;minimum assist (75% patient effort)  -     Assistive Device (Sit-Stand Transfers) walker, front-wheeled  -     Comment, (Sit-Stand Transfer) cues for hand placement  -       Row Name 09/07/23 1007          Gait/Stairs (Locomotion)    [place] Level (Gait) minimum assist (75% patient effort);1 person assist  -     Distance in Feet (Gait) 120+10  -     Deviations/Abnormal Patterns (Gait) bilateral deviations;jignesh decreased;stride length decreased;gait speed decreased  -     Bilateral Gait Deviations forward flexed posture  -     Comment, (Gait/Stairs) Pt ambulated with FWx with decreased gait speed, decreased stride length, and decreased jignesh. Cues for sequencing. Mobility limited d/t fatigue.  -                User Key  (r) = Recorded By, (t) = Taken By, (c) = Cosigned By      Initials Name Provider Type     Kaykay Terry PT Physical Therapist                   Obj/Interventions       Row Name 09/07/23 1016          Range of Motion Comprehensive    General Range of Motion bilateral lower extremity ROM WFL  -       Row Name 09/07/23 1016          Strength Comprehensive (MMT)    General Manual Muscle Testing (MMT) Assessment lower extremity strength deficits identified  -     Comment, General Manual Muscle Testing (MMT) Assessment BLE 4/5  -HM       Row Name 09/07/23 1016          Balance    Balance Assessment sitting static balance;sitting dynamic balance;sit to stand dynamic balance;standing static balance;standing dynamic balance  -HM     Static Sitting Balance standby assist  -     Dynamic Sitting Balance contact guard  -HM     Position, Sitting Balance unsupported;sitting edge of bed  -     Sit to Stand Dynamic Balance contact guard  -HM     Static Standing Balance contact guard  -HM     Dynamic Standing Balance minimal assist  -HM     Position/Device Used, Standing Balance supported;walker, front-wheeled  -       Row Name 09/07/23 1016          Sensory Assessment (Somatosensory)    Sensory Assessment (Somatosensory) other (see comments)  decreased light touch proximal to distal BLE with numbness in feet  -               User Key  (r) = Recorded By, (t) = Taken By, (c) = Cosigned By      Initials Name Provider Type     Kaykay Terry PT Physical Therapist                   Goals/Plan       Row Name 09/07/23 1022          Bed Mobility Goal 1 (PT)    Activity/Assistive Device (Bed Mobility Goal 1, PT) bed mobility activities, all  -HM     [place] Level/Cues Needed (Bed Mobility Goal 1, PT) independent  -HM     Time Frame (Bed Mobility Goal 1, PT) long term goal (LTG);10 days  -     Progress/Outcomes (Bed Mobility Goal 1, PT) new goal  -       Row Name 09/07/23 1022           Transfer Goal 1 (PT)    Activity/Assistive Device (Transfer Goal 1, PT) sit-to-stand/stand-to-sit;bed-to-chair/chair-to-bed  -HM     [place] Level/Cues Needed (Transfer Goal 1, PT) standby assist  -HM     Time Frame (Transfer Goal 1, PT) long term goal (LTG);10 days  -HM     Progress/Outcome (Transfer Goal 1, PT) new goal  -       Row Name 09/07/23 1022          Gait Training Goal 1 (PT)    Activity/Assistive Device (Gait Training Goal 1, PT) gait (walking locomotion)  -HM     [place] Level (Gait Training Goal 1, PT) standby assist  -HM     Distance (Gait Training Goal 1, PT) 250  -HM     Time Frame (Gait Training Goal 1, PT) long term goal (LTG);10 days  -HM     Progress/Outcome (Gait Training Goal 1, PT) new goal  -       Row Name 09/07/23 1022          Stairs Goal 1 (PT)    Activity/Assistive Device (Stairs Goal 1, PT) ascending stairs;descending stairs  -HM     [place] Level/Cues Needed (Stairs Goal 1, PT) standby assist  -HM     Number of Stairs (Stairs Goal 1, PT) 2  -HM     Time Frame (Stairs Goal 1, PT) long term goal (LTG);10 days  -HM     Progress/Outcome (Stairs Goal 1, PT) new [facility]  -       Row Name 09/07/23 1022          Therapy Assessment/Plan (PT)    Planned Therapy Interventions (PT) balance training;bed mobility training;gait training;home exercise program;neuromuscular re-education;patient/family education;postural re-education;strengthening;stair training;ROM (range of motion);transfer training  -               User Key  (r) = Recorded By, (t) = Taken By, (c) = Cosigned By      Initials Name Provider Type     Kaykay Terry, PT Physical Therapist                   Clinical Impression       Row Name 09/07/23 1017          Pain    Additional Documentation Pain Scale: FACES Pre/Post-Treatment (Group)  -       Row Name 09/07/23 1017          Pain Scale: FACES Pre/Post-Treatment    Pain: FACES Scale, Pretreatment 4-->hurts little more  -     Posttreatment Pain Rating  4-->hurts little more  -     Pain Location - head;neck  -     Pre/Posttreatment Pain Comment tolerated  -[facility] Name 09/07/23 1017          Plan of Care Review    Plan of Care Reviewed With patient  -     Progress no change  -     Outcome Evaluation PT eval completed. Pt ambulated 120+10 feet Min A x1 with FWx with decreased gait speed, decreased stride length, and decreased jignesh. Mobility limited d/t fatigue. Pt demonstrated balance deficits, strength deficits, and decreased functional endurance warranting IPPT POC. d/c rec IRF d/t increased falls risk at this time.  -[facility] Name 09/07/23 1017          Therapy Assessment/Plan (PT)    Rehab Potential (PT) good, to achieve stated therapy goals  -     Criteria for Skilled Interventions Met (PT) yes;skilled treatment is necessary;meets criteria  -     Therapy Frequency (PT) daily  -[facility] Name 09/07/23 1017          Vital Signs    Pre Systolic BP Rehab 151  -HM     Pre Treatment Diastolic   -HM     Post Systolic BP Rehab 150  -HM     Post Treatment Diastolic   -HM     Pretreatment Heart Rate (beats/min) 90  -HM     Posttreatment Heart Rate (beats/min) 96  -HM     O2 Delivery Pre Treatment room air  -HM     O2 Delivery Intra Treatment room air  -     Post SpO2 (%) 92  -HM     O2 Delivery Post Treatment room air  -     Pre Patient Position Supine  -     Intra Patient Position Standing  -     Post Patient Position Sitting  -[facility] Name 09/07/23 1017          Positioning and Restraints    Pre-Treatment Position in bed  -     Post Treatment Position chair  -     In Chair notified nsg;sitting;reclined;call light within reach;encouraged to call for assist;exit alarm on;waffle cushion;with other staff;legs elevated  -               User Key  (r) = Recorded By, (t) = Taken By, (c) = Cosigned By      Initials Name Provider Type    Kaykay Jo, PT Physical Therapist                   Outcome Measures        Row Name 09/07/23 1023 09/06/23 2345       How much help from another person do you currently need...    Turning from your back to your side while in flat bed without using bedrails? 3  - 4  -TD    Moving from lying on back to sitting on the side of a flat bed without bedrails? 3  - 4  -TD    Moving to and from a bed to a chair (including a wheelchair)? 3  - 4  -TD    Standing up from a chair using your arms (e.g., wheelchair, bedside chair)? 3  - 4  -TD    Climbing 3-5 steps with a railing? 2  - 3  -TD    To walk in hospital room? 3  - 3  -TD    AM-PAC 6 Clicks Score (PT) 17  - 22  -TD    Highest level of mobility 5 --> Static standing  - 7 --> Walked 25 feet or more  -TD      Row Name 09/07/23 1023          Functional Assessment    Outcome Measure Options AM-PAC 6 Clicks Basic Mobility (PT)  -               User Key  (r) = Recorded By, (t) = Taken By, (c) = Cosigned By      Initials Name Provider Type    TD Ana Duffy RN Registered Nurse     Kaykay Terry, JESI Physical Therapist                                 Physical Therapy Education       Title: PT OT SLP Therapies (In Progress)       Topic: Physical Therapy (In Progress)       Point: Mobility training (Done)       Learning Progress Summary             Patient Acceptance, E,TB, VU,NR by  at 9/7/2023 1024                         Point: Home exercise program (Not Started)       Learner Progress:  Not documented in this visit.              Point: Body mechanics (Done)       Learning Progress Summary             Patient Acceptance, E,TB, VU,NR by  at 9/7/2023 1024                         Point: Precautions (Done)       Learning Progress Summary             Patient Acceptance, E,TB, VU,NR by  at 9/7/2023 1024                                         User Key       Initials Effective Dates Name Provider Type Discipline     09/22/22 -  Kaykay Terry PT Physical Therapist PT                  PT Recommendation and Plan  Planned  Therapy Interventions (PT): balance training, bed mobility training, gait training, home exercise program, neuromuscular re-education, patient/family education, postural re-education, strengthening, stair training, ROM (range of motion), transfer training  Plan of Care Reviewed With: patient  Progress: no change  Outcome Evaluation: PT eval completed. Pt ambulated 120+10 feet Min A x1 with FWx with decreased gait speed, decreased stride length, and decreased jignesh. Mobility limited d/t fatigue. Pt demonstrated balance deficits, strength deficits, and decreased functional endurance warranting IPPT POC. d/c rec IRF d/t increased falls risk at this time.     Time Calculation:   PT Evaluation Complexity  History, PT Evaluation Complexity: 3 or more personal factors and/or comorbidities  Examination of Body Systems (PT Eval Complexity): total of 3 or more elements  Clinical Presentation (PT Evaluation Complexity): evolving  Clinical Decision Making (PT Evaluation Complexity): moderate complexity  Overall Complexity (PT Evaluation Complexity): moderate complexity     PT Charges       Row Name 09/07/23 1024             Time Calculation    Start Time 0945  -HM      PT Received On 09/07/23  -      PT Goal Re-Cert Due Date 09/17/23  -         Untimed Charges    PT Eval/Re-eval Minutes 46  -HM         Total Minutes    Untimed Charges Total Minutes 46  -HM       Total Minutes 46  -HM                User Key  (r) = Recorded By, (t) = Taken By, (c) = Cosigned By      Initials Name Provider Type     Kaykay Terry, JESI Physical Therapist                  Therapy Charges for Today       Code Description Service Date Service Provider Modifiers Qty    [id number]  PT EVAL MOD COMPLEXITY 4 9/7/2023 Kaykay Terry, PT GP 1            PT G-Codes  Outcome Measure Options: AM-PAC 6 Clicks Basic Mobility (PT)  AM-PAC 6 Clicks Score (PT): 17  PT Discharge Summary  Anticipated Discharge Disposition (PT): inpatient rehabilitation  facility    Kaykay Terry, PT  9/7/2023

## 2023-09-07 NOTE — ED PROVIDER NOTES
EMERGENCY DEPARTMENT ENCOUNTER    Pt Name: Lizbeth Mckeon  MRN: [id number]  Pt :   1950  Room Number:    Date of encounter:  2023  PCP: Nydia Guzmán MD  ED Provider: Tony Schwartz MD    Historian: Patient, daughter, son      HPI:  Chief Complaint: Fall, head injury, new onset atrial fibrillation        Context: Lizbeth Mckeon is a 73 y.o. female who presents to the ED c/o stumble and fall while taking out her trash can.  The patient was attempting to arrange that can when she feels that she stumbled over her own feet and fell to the ground.  She has had a lot of problems with falls recently.  She struck the occipital region of her scalp and has moderate discomfort there at this point.  She does not take blood thinners.  She was not using her walker.  Patient denies history of atrial fibrillation.  She did suffer CVA with carotid endarterectomy and recurrent SVT leading to an SVT ablation all in .  Patient's daughter who is a psychiatrist notes that she has had bilateral foot swelling ongoing for the last few weeks.  This is new for the patient.      PAST MEDICAL HISTORY  Past Medical History:   Diagnosis Date    Atrial tachycardia     Carotid artery disease     Depression     Hypertension     Hyponatremia     Peripheral neuropathy     Stroke (cerebrum)          PAST SURGICAL HISTORY  Past Surgical History:   Procedure Laterality Date    CARDIAC ABLATION      CAROTID ENDARTERECTOMY Left     CHOLECYSTECTOMY      KNEE ARTHROPLASTY Bilateral     NISSEN FUNDOPLICATION      TONSILLECTOMY AND ADENOIDECTOMY           FAMILY HISTORY  Family History   Problem Relation Age of Onset    Multiple sclerosis Mother     Cancer Mother     Stroke Father          SOCIAL HISTORY  Social History     Socioeconomic History    Marital status:    Tobacco Use    Smoking status: Former     Years: 20.00     Types: Cigarettes     Quit date:      Years since quittin.7    Smokeless tobacco:  Never   Vaping Use    Vaping Use: Former    Substances: Nicotine    Devices: Disposable   Substance and Sexual Activity    Alcohol use: Yes     Alcohol/week: 6.0 standard drinks     Types: 6 Glasses of wine per week     Comment: 1 bottle of wine per night    Drug use: Never    Sexual activity: Defer         ALLERGIES  Penicillins and Sulfa antibiotics        REVIEW OF SYSTEMS  Review of Systems       All systems reviewed and negative except for those discussed in HPI.       PHYSICAL EXAM    I have reviewed the triage vital signs and nursing notes.    ED Triage Vitals [09/06/23 1924]   Temp Heart Rate Resp BP SpO2   98.5 °F (36.9 °C) (!) 135 20 (!) 163/108 96 %      Temp src Heart Rate Source Patient Position BP Location FiO2 (%)   Oral Monitor -- Left arm --       Physical Exam  GENERAL:   Appears in no acute distress.   HENT: Nares patent.  Right parietal occipital region with moderate size scalp hematoma  EYES: No scleral icterus.  CV: Irregular rhythm, tachycardic rate.  No murmurs gallops rubs  RESPIRATORY: Normal effort.  No audible wheezes, rales or rhonchi.  Clear to auscultation  ABDOMEN: Soft, nontender  MUSCULOSKELETAL: No deformities.   NEURO: Alert, moves all extremities, follows commands.  SKIN: Warm, dry, no rash visualized.      LAB RESULTS  Recent Results (from the past 24 hour(s))   Comprehensive Metabolic Panel    Collection Time: 09/06/23  7:35 PM    Specimen: Blood   Result Value Ref Range    Glucose 108 (H) 65 - 99 mg/dL    BUN 13 8 - 23 mg/dL    Creatinine 0.65 0.57 - 1.00 mg/dL    Sodium 134 (L) 136 - 145 mmol/L    Potassium 4.3 3.5 - 5.2 mmol/L    Chloride 97 (L) 98 - 107 mmol/L    CO2 24.0 22.0 - 29.0 mmol/L    Calcium 9.3 8.6 - 10.5 mg/dL    Total Protein 6.8 6.0 - 8.5 g/dL    Albumin 4.0 3.5 - 5.2 g/dL    ALT (SGPT) 16 1 - 33 U/L    AST (SGOT) 23 1 - 32 U/L    Alkaline Phosphatase 80 39 - 117 U/L    Total Bilirubin 0.5 0.0 - 1.2 mg/dL    Globulin 2.8 gm/dL    A/G Ratio 1.4 g/dL     BUN/Creatinine Ratio 20.0 7.0 - 25.0    Anion Gap 13.0 5.0 - 15.0 mmol/L    eGFR 91.9 >60.0 mL/min/1.73   Magnesium    Collection Time: 09/06/23  7:35 PM    Specimen: Blood   Result Value Ref Range    Magnesium 1.8 1.6 - 2.4 mg/dL   Single High Sensitivity Troponin T    Collection Time: 09/06/23  7:35 PM    Specimen: Blood   Result Value Ref Range    HS Troponin T 15 (H) <10 ng/L   TSH    Collection Time: 09/06/23  7:35 PM    Specimen: Blood   Result Value Ref Range    TSH 1.160 0.270 - 4.200 uIU/mL   BNP    Collection Time: 09/06/23  7:35 PM    Specimen: Blood   Result Value Ref Range    proBNP 1,833.0 (H) 0.0 - 1,800.0 pg/mL   Green Top (Gel)    Collection Time: 09/06/23  7:35 PM   Result Value Ref Range    Extra Tube Hold for add-ons.    Lavender Top    Collection Time: 09/06/23  7:35 PM   Result Value Ref Range    Extra Tube hold for add-on    Gold Top - SST    Collection Time: 09/06/23  7:35 PM   Result Value Ref Range    Extra Tube Hold for add-ons.    Light Blue Top    Collection Time: 09/06/23  7:35 PM   Result Value Ref Range    Extra Tube Hold for add-ons.    CBC Auto Differential    Collection Time: 09/06/23  7:35 PM    Specimen: Blood   Result Value Ref Range    WBC 5.95 3.40 - 10.80 10*3/mm3    RBC 4.00 3.77 - 5.28 10*6/mm3    Hemoglobin 12.9 12.0 - 15.9 g/dL    Hematocrit 38.6 34.0 - 46.6 %    MCV 96.5 79.0 - 97.0 fL    MCH 32.3 26.6 - 33.0 pg    MCHC 33.4 31.5 - 35.7 g/dL    RDW 13.3 12.3 - 15.4 %    RDW-SD 47.8 37.0 - 54.0 fl    MPV 8.5 6.0 - 12.0 fL    Platelets 272 140 - 450 10*3/mm3    Neutrophil % 51.5 42.7 - 76.0 %    Lymphocyte % 39.0 19.6 - 45.3 %    Monocyte % 7.1 5.0 - 12.0 %    Eosinophil % 1.7 0.3 - 6.2 %    Basophil % 0.5 0.0 - 1.5 %    Immature Grans % 0.2 0.0 - 0.5 %    Neutrophils, Absolute 3.07 1.70 - 7.00 10*3/mm3    Lymphocytes, Absolute 2.32 0.70 - 3.10 10*3/mm3    Monocytes, Absolute 0.42 0.10 - 0.90 10*3/mm3    Eosinophils, Absolute 0.10 0.00 - 0.40 10*3/mm3    Basophils,  Absolute 0.03 0.00 - 0.20 10*3/mm3    Immature Grans, Absolute 0.01 0.00 - 0.05 10*3/mm3    nRBC 0.0 0.0 - 0.2 /100 WBC   Heparin Anti-Xa    Collection Time: 09/06/23  7:35 PM    Specimen: Blood   Result Value Ref Range    Heparin Anti-Xa (UFH) 0.10 (L) 0.30 - 0.70 IU/ml   Protime-INR    Collection Time: 09/06/23  7:35 PM    Specimen: Blood   Result Value Ref Range    Protime 13.7 12.2 - 14.5 Seconds    INR 1.04 0.89 - 1.12   aPTT    Collection Time: 09/06/23  7:35 PM    Specimen: Blood   Result Value Ref Range    PTT 29.4 (L) 60.0 - 90.0 seconds   ECG 12 Lead ED Triage Standing Order; Dysrhythmia    Collection Time: 09/06/23  7:47 PM   Result Value Ref Range    QT Interval 306 ms    QTC Interval 446 ms       If labs were ordered, I independently reviewed the results and considered them in treating the patient.        RADIOLOGY  CT Head Without Contrast    Result Date: 9/6/2023  CT HEAD WO CONTRAST Date of Exam: 9/6/2023 9:20 PM EDT Indication: head injury. Comparison: None available. Technique: Axial CT images were obtained of the head without contrast administration.  Automated exposure control and iterative construction methods were used. Findings: There is a contusion at the right parietal scalp. No acute intracranial hemorrhage. No acute large territory infarct. There are mild scattered subcortical and periventricular white matter hypodensities which are nonspecific and can be seen in the setting  of chronic small vessel ischemic change. No extra-axial collections. No midline shift or herniation. Normal size and configuration of the ventricles. Normal appearance of the orbits. The partially imaged paranasal sinuses are clear. The mastoid air cells are clear. No acute calvarial fracture. No acute osseous findings.     Impression: Right parietal scalp contusion. No acute intracranial findings. Electronically Signed: Mike Patel MD  9/6/2023 9:30 PM EDT  Workstation ID: [id number]    XR Chest 1 View    Result  Date: 9/6/2023  XR CHEST 1 VW Date of Exam: 9/6/2023 7:25 PM EDT Indication: Dysrhythmia triage protocol Comparison: None available. Findings:  There is no acute infiltrate. There is no large pleural effusion. The cardiac silhouette is unremarkable. The visualized negra structures demonstrate no abnormality.     Impression: No acute pulmonary disease. Electronically Signed: Osvaldo Pantoja MD  9/6/2023 7:50 PM EDT  Workstation ID: [id number]     I ordered and independently reviewed the above noted radiographic studies.      I viewed images of chest x-ray which showed no active disease per my independent interpretation.    See radiologist's dictation for official interpretation.        PROCEDURES    Critical Care  Performed by: Tony Schwartz MD  Authorized by: Tony Schwartz MD     Critical care provider statement:     Critical care time (minutes):  35    Critical care time was exclusive of:  Separately billable procedures and treating other patients    Critical care was necessary to treat or prevent imminent or life-threatening deterioration of the following conditions:  Cardiac failure and circulatory failure    Critical care was time spent personally by me on the following activities:  Ordering and performing treatments and interventions, ordering and review of laboratory studies, ordering and review of radiographic studies, pulse oximetry, re-evaluation of patient's condition, review of old charts, obtaining history from patient or surrogate, examination of patient, evaluation of patient's response to treatment, discussions with consultants and development of treatment plan with patient or surrogate    ECG 12 Lead ED Triage Standing Order; Dysrhythmia   Final Result   Test Reason : ED Triage Standing Order~   Blood Pressure :   */*   mmHG   Vent. Rate : 128 BPM     Atrial Rate : 208 BPM      P-R Int :   * ms          QRS Dur :  58 ms       QT Int : 306 ms       P-R-T Axes :   *  53  27 degrees      QTc Int :  446 ms      Atrial fibrillation with rapid ventricular response   Abnormal ECG   When compared with ECG of 15-FEB-2023 16:44,   Atrial fibrillation has replaced Sinus rhythm   T wave amplitude has decreased in Anterior leads   Confirmed by ANNABELLA KABA MD ([id number]) on 9/6/2023 8:43:26 PM      Referred By: EDMD           Confirmed By: ANNABELLA KABA MD          MEDICATIONS GIVEN IN ER    Medications   sodium chloride 0.9 % flush 10 mL (has no administration in time range)   dilTIAZem (CARDIZEM) injection 10 mg (has no administration in time range)   dilTIAZem (CARDIZEM) 125 mg in sodium chloride 0.9 % 125 mL infusion (has no administration in time range)   heparin (porcine) injection 4,000 Units (has no administration in time range)   heparin 88382 units/250 mL (100 units/mL) in 0.45 % NaCl infusion (has no administration in time range)   Pharmacy to Dose Heparin (has no administration in time range)         MEDICAL DECISION MAKING, PROGRESS, and CONSULTS    All labs have been independently reviewed by me.  All radiology studies have been reviewed by me and the radiologist dictating the report.  All EKG's have been independently viewed and interpreted by me/my attending physician.      Discussion below represents my analysis of pertinent findings related to patient's condition, differential diagnosis, treatment plan and final disposition.      Differential diagnosis:    Fall with head injury.  Consider intracranial hemorrhage, skull fracture.  New onset atrial fibrillation with rapid ventricular response.  Consider this as a potential preceding cause to the fall.  Early decompensation with elevated BNP and peripheral edema considered.      Additional sources:    - Discussed/ obtained information from independent historians: I spoke with both the patient's son and daughter at the bedside.    - External (non-ED) record review: I reviewed the patient's twelve-lead EKG performed 2/15/2023 which showed sinus tachycardia.  No  evidence of atrial fibrillation at that point.  I reviewed multiple records including her last discharge summary which did not discuss atrial fibrillation as well.    - Chronic or social conditions impacting care: Requires a walker but does not always use 1.    - Shared decision making: Patient and family in agreement with current plan for evaluation treatment and admission.      Orders placed during this visit:  Orders Placed This Encounter   Procedures    Critical Care    XR Chest 1 View    CT Head Without Contrast    [place] Draw    Comprehensive Metabolic Panel    Magnesium    Single High Sensitivity Troponin T    TSH    BNP    CBC Auto Differential    Heparin Anti-Xa    Protime-INR    aPTT    NPO Diet NPO Type: Strict NPO    Undress & Gown    Continuous Pulse Oximetry    Notify Provider Platelet Count Less Than 01841    Stop Infusion & Notify Provider if Bleeding Occurs    Please place the patient in a gown and see if we can get a more accurate blood pressure, manual cuff.  Misc Nursing Order (Specify)    Oxygen Therapy- Nasal Cannula; Titrate 1-6 LPM Per SpO2; 90 - 95%    ECG 12 Lead ED Triage Standing Order; Dysrhythmia    Insert Peripheral IV    Inpatient Admission    ED Bed Request    CBC & Differential    Green Top (Gel)    Lavender Top    Gold Top - SST    Gray Top    Light Blue Top    CBC & Differential         Additional orders considered but not ordered:  Echocardiogram    ED Course:    Consultants:      ED Course as of 09/06/23 2141   Wed Sep 06, 2023   2105 I have ordered diltiazem bolus and drip with titration parameters.  I have ordered heparin bolus and drip with titration parameters.  I spoke with the patient, her psychiatrist daughter, her son in detail about my findings.  Her heart rate has been running in the 130s to 140s and she has been hypertensive.  This along with a BNP that is elevated I am concerned that she is bordering on early CHF.  I do not think that outpatient care is appropriate.  " I have contacted the hospitalist for admission. [MS]   2121 [id number] Score for Atrial Fibrillation Stroke Risk - MDCalc  Calculated on Sep 06 2023 9:21 PM  7 points -> Stroke risk was 11.2% per year in >90,000 patients (the [place] Atrial Fibrillation Cohort Study) and 15.7% risk of stroke/TIA/systemic embolism. One recommendation suggests a 0 score for men or 1 score for women (no clinical risk factors) is \"low\" risk and may not require anticoagulation; a 1 score for men or 2 score for women is \"low-moderate\" risk and should consider antiplatelet or anticoagulation; and a score =2 for men or =3 for women is \"moderate-high\" risk and should otherwise be an anticoagulation candidate. [MS]      ED Course User Index  [MS] Tony Schwartz MD              Shared Decision Making:  After my consideration of clinical presentation and any laboratory/radiology studies obtained, I discussed the findings with the patient/patient representative who is in agreement with the treatment plan and the final disposition.   Risks and benefits of discharge and/or observation/admission were discussed.       AS OF 21:41 EDT VITALS:    BP - (!) 162/103  HR - (!) 135  TEMP - 98.5 °F (36.9 °C) (Oral)  O2 SATS - 94%                  DIAGNOSIS  Final diagnoses:   Atrial fibrillation with rapid ventricular response   Fall, initial encounter   Injury of head, initial encounter   Hematoma of scalp, initial encounter   Uncontrolled hypertension         DISPOSITION  Admission      Please note that portions of this document were completed with voice recognition software.        Tony Schwartz MD  09/06/23 [id number]    "

## 2023-09-07 NOTE — PLAN OF CARE
Goal Outcome Evaluation:  Plan of Care Reviewed With: patient        Progress: no change  Outcome Evaluation: PT eval completed. Pt ambulated 120+10 feet Min A x1 with FWx with decreased gait speed, decreased stride length, and decreased jignesh. Mobility limited d/t fatigue. Pt demonstrated balance deficits, strength deficits, and decreased functional endurance warranting IPPT POC. d/c rec IRF d/t increased falls risk at this time.      Anticipated Discharge Disposition (PT): inpatient rehabilitation facility

## 2023-09-08 ENCOUNTER — APPOINTMENT (OUTPATIENT)
Dept: MRI IMAGING | Facility: HOSPITAL | Age: 73
DRG: 309 | End: 2023-09-08
Payer: MEDICARE

## 2023-09-08 LAB
ANION GAP SERPL CALCULATED.3IONS-SCNC: 9 MMOL/L (ref 5–15)
BUN SERPL-MCNC: 14 MG/DL (ref 8–23)
BUN/CREAT SERPL: 19.2 (ref 7–25)
CALCIUM SPEC-SCNC: 8.7 MG/DL (ref 8.6–10.5)
CHLORIDE SERPL-SCNC: 97 MMOL/L (ref 98–107)
CO2 SERPL-SCNC: 23 MMOL/L (ref 22–29)
CREAT SERPL-MCNC: 0.73 MG/DL (ref 0.57–1)
DEPRECATED RDW RBC AUTO: 47.1 FL (ref 37–54)
EGFRCR SERPLBLD CKD-EPI 2021: 85.9 ML/MIN/1.73
ERYTHROCYTE [DISTWIDTH] IN BLOOD BY AUTOMATED COUNT: 13.2 % (ref 12.3–15.4)
GLUCOSE SERPL-MCNC: 98 MG/DL (ref 65–99)
HCT VFR BLD AUTO: 36.6 % (ref 34–46.6)
HGB BLD-MCNC: 12.2 G/DL (ref 12–15.9)
MAGNESIUM SERPL-MCNC: 1.8 MG/DL (ref 1.6–2.4)
MCH RBC QN AUTO: 32.3 PG (ref 26.6–33)
MCHC RBC AUTO-ENTMCNC: 33.3 G/DL (ref 31.5–35.7)
MCV RBC AUTO: 96.8 FL (ref 79–97)
PLATELET # BLD AUTO: 247 10*3/MM3 (ref 140–450)
PMV BLD AUTO: 8.9 FL (ref 6–12)
POTASSIUM SERPL-SCNC: 4 MMOL/L (ref 3.5–5.2)
QT INTERVAL: 380 MS
QTC INTERVAL: 472 MS
RBC # BLD AUTO: 3.78 10*6/MM3 (ref 3.77–5.28)
SODIUM SERPL-SCNC: 129 MMOL/L (ref 136–145)
UFH PPP CHRO-ACNC: 0.34 IU/ML (ref 0.3–0.7)
UFH PPP CHRO-ACNC: 0.36 IU/ML (ref 0.3–0.7)
WBC NRBC COR # BLD: 6.37 10*3/MM3 (ref 3.4–10.8)

## 2023-09-08 PROCEDURE — 25010000002 FUROSEMIDE PER 20 MG: Performed by: INTERNAL MEDICINE

## 2023-09-08 PROCEDURE — 85520 HEPARIN ASSAY: CPT

## 2023-09-08 PROCEDURE — 25010000002 LORAZEPAM PER 2 MG: Performed by: INTERNAL MEDICINE

## 2023-09-08 PROCEDURE — 80048 BASIC METABOLIC PNL TOTAL CA: CPT | Performed by: HOSPITALIST

## 2023-09-08 PROCEDURE — 25010000002 HEPARIN (PORCINE) 25000-0.45 UT/250ML-% SOLUTION

## 2023-09-08 PROCEDURE — 99232 SBSQ HOSP IP/OBS MODERATE 35: CPT | Performed by: INTERNAL MEDICINE

## 2023-09-08 PROCEDURE — 70551 MRI BRAIN STEM W/O DYE: CPT

## 2023-09-08 PROCEDURE — 85027 COMPLETE CBC AUTOMATED: CPT | Performed by: HOSPITALIST

## 2023-09-08 PROCEDURE — 83735 ASSAY OF MAGNESIUM: CPT | Performed by: HOSPITALIST

## 2023-09-08 PROCEDURE — 25010000002 MAGNESIUM SULFATE 2 GM/50ML SOLUTION: Performed by: STUDENT IN AN ORGANIZED HEALTH CARE EDUCATION/TRAINING PROGRAM

## 2023-09-08 PROCEDURE — 25010000002 THIAMINE PER 100 MG: Performed by: INTERNAL MEDICINE

## 2023-09-08 PROCEDURE — 25010000002 PROCHLORPERAZINE 10 MG/2ML SOLUTION: Performed by: STUDENT IN AN ORGANIZED HEALTH CARE EDUCATION/TRAINING PROGRAM

## 2023-09-08 PROCEDURE — 99232 SBSQ HOSP IP/OBS MODERATE 35: CPT | Performed by: STUDENT IN AN ORGANIZED HEALTH CARE EDUCATION/TRAINING PROGRAM

## 2023-09-08 RX ORDER — FUROSEMIDE 10 MG/ML
20 INJECTION INTRAMUSCULAR; INTRAVENOUS ONCE
Status: COMPLETED | OUTPATIENT
Start: 2023-09-08 | End: 2023-09-08

## 2023-09-08 RX ORDER — MAGNESIUM SULFATE HEPTAHYDRATE 40 MG/ML
2 INJECTION, SOLUTION INTRAVENOUS ONCE
Status: COMPLETED | OUTPATIENT
Start: 2023-09-08 | End: 2023-09-08

## 2023-09-08 RX ORDER — PROCHLORPERAZINE EDISYLATE 5 MG/ML
5 INJECTION INTRAMUSCULAR; INTRAVENOUS ONCE
Status: COMPLETED | OUTPATIENT
Start: 2023-09-08 | End: 2023-09-08

## 2023-09-08 RX ORDER — LORAZEPAM 1 MG/1
1 TABLET ORAL EVERY 6 HOURS
Status: DISCONTINUED | OUTPATIENT
Start: 2023-09-08 | End: 2023-09-09 | Stop reason: HOSPADM

## 2023-09-08 RX ORDER — METOPROLOL TARTRATE 50 MG/1
50 TABLET, FILM COATED ORAL EVERY 12 HOURS SCHEDULED
Status: DISCONTINUED | OUTPATIENT
Start: 2023-09-08 | End: 2023-09-09 | Stop reason: HOSPADM

## 2023-09-08 RX ADMIN — ASPIRIN 81 MG: 81 TABLET, COATED ORAL at 08:45

## 2023-09-08 RX ADMIN — MULTIPLE VITAMINS W/ MINERALS TAB 1 TABLET: TAB at 08:45

## 2023-09-08 RX ADMIN — LISINOPRIL 20 MG: 20 TABLET ORAL at 08:45

## 2023-09-08 RX ADMIN — HEPARIN SODIUM 12.5 UNITS/KG/HR: 10000 INJECTION, SOLUTION INTRAVENOUS at 17:50

## 2023-09-08 RX ADMIN — HYDROCHLOROTHIAZIDE 12.5 MG: 12.5 CAPSULE ORAL at 08:45

## 2023-09-08 RX ADMIN — THIAMINE HYDROCHLORIDE 200 MG: 100 INJECTION, SOLUTION INTRAMUSCULAR; INTRAVENOUS at 06:56

## 2023-09-08 RX ADMIN — ACETAMINOPHEN 650 MG: 325 TABLET ORAL at 12:40

## 2023-09-08 RX ADMIN — ATORVASTATIN CALCIUM 20 MG: 20 TABLET, FILM COATED ORAL at 20:38

## 2023-09-08 RX ADMIN — MAGNESIUM SULFATE HEPTAHYDRATE 2 G: 2 INJECTION, SOLUTION INTRAVENOUS at 17:43

## 2023-09-08 RX ADMIN — Medication 5 MG: at 20:38

## 2023-09-08 RX ADMIN — THIAMINE HYDROCHLORIDE 200 MG: 100 INJECTION, SOLUTION INTRAMUSCULAR; INTRAVENOUS at 21:26

## 2023-09-08 RX ADMIN — PROCHLORPERAZINE EDISYLATE 5 MG: 5 INJECTION INTRAMUSCULAR; INTRAVENOUS at 17:37

## 2023-09-08 RX ADMIN — ACETAMINOPHEN 650 MG: 325 TABLET ORAL at 20:38

## 2023-09-08 RX ADMIN — LORAZEPAM 1 MG: 2 INJECTION INTRAMUSCULAR; INTRAVENOUS at 23:29

## 2023-09-08 RX ADMIN — ESCITALOPRAM OXALATE 10 MG: 10 TABLET ORAL at 20:38

## 2023-09-08 RX ADMIN — LORAZEPAM 1 MG: 1 TABLET ORAL at 06:56

## 2023-09-08 RX ADMIN — FUROSEMIDE 20 MG: 10 INJECTION, SOLUTION INTRAMUSCULAR; INTRAVENOUS at 08:45

## 2023-09-08 RX ADMIN — THIAMINE HYDROCHLORIDE 200 MG: 100 INJECTION, SOLUTION INTRAMUSCULAR; INTRAVENOUS at 14:26

## 2023-09-08 RX ADMIN — FOLIC ACID 1 MG: 1 TABLET ORAL at 08:45

## 2023-09-08 RX ADMIN — Medication 10 ML: at 08:47

## 2023-09-08 RX ADMIN — LORAZEPAM 1 MG: 2 INJECTION INTRAMUSCULAR; INTRAVENOUS at 14:25

## 2023-09-08 RX ADMIN — METOPROLOL TARTRATE 50 MG: 50 TABLET ORAL at 20:39

## 2023-09-08 RX ADMIN — METOPROLOL TARTRATE 50 MG: 50 TABLET ORAL at 08:45

## 2023-09-08 RX ADMIN — Medication 10 ML: at 20:40

## 2023-09-08 NOTE — PROGRESS NOTES
HEPARIN INFUSION  Lizbeth Mckeon is a  73 y.o. female receiving heparin infusion.     Therapy for (VTE/Cardiac): Cardiac  Patient Weight: 93.4 kg  Initial Bolus (Y/N): Y  Any Bolus (Y/N): Y        Signs or Symptoms of Bleeding: no new signs/symptoms noted     Cardiac or Other (Not VTE)   Initial Bolus: 60 units/kg (Max 4,000 units)  Initial rate: 12 units/kg/hr (Max 1,000 units/hr)   Anti Xa Rebolus Infusion Hold time Change infusion Dose (Units/kg/hr) Next Anti Xa or aPTT Level Due   < 0.11 50 Units/kg  (4000 Units Max) None Increase by  3 Units/kg/hr 6 hours   0.11- 0.19 25 Units/kg  (2000 Units Max) None Increase by  2 Units/kg/hr 6 hours   0.2 - 0.29 0 None Increase by  1 Units/kg/hr 6 hours   0.3 - 0.5 0 None No Change 6 hours (after 2 consecutive levels in range check qAM)   0.51 - 0.6 0 None Decrease by  1 Units/kg/hr 6 hours   0.61 - 0.8 0 30 Minutes Decrease by  2 Units/kg/hr 6 hours   0.81 - 1 0 60 Minutes Decrease by  3 Units/kg/hr 6 hours   >1 0 Hold  After Anti Xa less than 0.5 decrease previous rate by  4 Units/kg/hr  Every 2 hours until Anti Xa  less than 0.5 then when infusion restarts in 6 hours       Results from last 7 days   Lab Units 09/08/23  0210 09/07/23  0403 09/06/23  1935   INR   --   --  1.04   HEMOGLOBIN g/dL 12.2 12.1 12.9   HEMATOCRIT % 36.6 36.1 38.6   PLATELETS 10*3/mm3 247 258 272          Date   Time   Anti-Xa Current Rate (Unit/kg/hr) Bolus   (Units) Rate Change   (Unit/kg/hr) New Rate (Unit/kg/hr) Next   Anti-Xa Comments  Pump Check Daily   9/6 2100 0.1 0 4000 +10.5 10.5 0400 DW RN, pump verified   9/7 0403 0.27 10.5 -- +1 11.5 1100 D/w RN   9/7 1111 0.37 11.5 -- 0 11.5 1800 D/W RN. Pump checked    9/7 1824 0.25 11.5 -- +1 12.5 0200 D/w IMELDA Perez   9/8 0210 0.34 12.5  -- -- 12.5 0800 Discussed w/ nurse   9/8 0914 0.36 12.5 -- -- 12.5 0600 D/w RN                                                                                                                                                                             Thank you.  Chidi Rivera, PharmD  9/8/2023  09:42 EDT

## 2023-09-08 NOTE — PROGRESS NOTES
"    [facility] Medicine Services  PROGRESS NOTE    Patient Name: Lizbeth Mckeon  : 1950  MRN: [id number]    Date of Admission: 2023  Primary Care Physician: Nydia Guzmán MD    Subjective   Subjective     CC: F/u fall      HPI:  Evaluated patient this morning. Patient reports having headache and mild nausea since yesterday afternoon. States that she feels \"off\". Tells me that she drinks 1-2 glasses of wine every night. However, spoke with son this afternoon who reports that she drinks 1-2 BOTTLES of wine every night.     ROS:  Gen- no fevers, chills   CV- no chest pain, no palpitations  Resp- no cough, no dyspnea  Neuro- occipital headache  GI- mild nausea, no vomiting, no abdominal pain    Objective   Objective     Vital Signs:   Temp:  [97.6 °F (36.4 °C)-98.7 °F (37.1 °C)] 97.6 °F (36.4 °C)  Heart Rate:  [] 111  Resp:  [18] 18  BP: (117-164)/() 140/97  Flow (L/min):  [2] 2     Physical Exam:  Gen- no acute distress  HENT- NCAT, mucous membranes moist  CV- irregularly irregular, S1 S2 normal, no m/r/g  Resp- mild crackles bilaterally   Abd- soft, NT, ND, +BS  Ext- no edema  Neuro- A&Ox2 (not to date), no focal deficits, sensation to light touch decreased in bilateral lower extremities.  Skin- no rashes  Psych- appropriate mood    Results Reviewed:  LAB RESULTS:      Lab 23  0839 23  0210 23  1824 23  1111 23  0403 23  2352 23  1935   WBC  --  6.37  --   --  5.41  --  5.95   HEMOGLOBIN  --  12.2  --   --  12.1  --  12.9   HEMATOCRIT  --  36.6  --   --  36.1  --  38.6   PLATELETS  --  247  --   --  258  --  272   NEUTROS ABS  --   --   --   --  2.70  --  3.07   IMMATURE GRANS (ABS)  --   --   --   --  0.00  --  0.01   LYMPHS ABS  --   --   --   --  2.18  --  2.32   MONOS ABS  --   --   --   --  0.36  --  0.42   EOS ABS  --   --   --   --  0.14  --  0.10   MCV  --  96.8  --   --  95.5  --  96.5   PROTIME  --   --   -- "   --   --   --  13.7   APTT  --   --   --   --   --   --  29.4*   HEPARIN ANTI-XA 0.36 0.34 0.25* 0.37 0.27*   < > 0.10*    < > = values in this interval not displayed.         Lab 09/08/23  0210 09/06/23 1935   SODIUM 129* 134*   POTASSIUM 4.0 4.3   CHLORIDE 97* 97*   CO2 23.0 24.0   ANION GAP 9.0 13.0   BUN 14 13   CREATININE 0.73 0.65   EGFR 85.9 91.9   GLUCOSE 98 108*   CALCIUM 8.7 9.3   MAGNESIUM 1.8 1.8   TSH  --  1.160         Lab 09/06/23 1935   TOTAL PROTEIN 6.8   ALBUMIN 4.0   GLOBULIN 2.8   ALT (SGPT) 16   AST (SGOT) 23   BILIRUBIN 0.5   ALK PHOS 80         Lab 09/07/23  0159 09/06/23  2352 09/06/23 1935   PROBNP  --   --  1,833.0*   HSTROP T 13* 14* 15*   PROTIME  --   --  13.7   INR  --   --  1.04                 Brief Urine Lab Results  (Last result in the past 365 days)        Color   Clarity   Blood   Leuk Est   Nitrite   Protein   CREAT   Urine HCG        07/24/23 1125 Yellow   Clear     Moderate                       Microbiology Results Abnormal       None            Adult Transthoracic Echo Complete With Contrast if Necessary Per Protocol    Result Date: 9/7/2023    Left ventricular systolic function is normal. Estimated left ventricular EF = 52%   The left atrial cavity is moderately dilated.   Mild to moderate mitral regurgitation is present   Estimated right ventricular systolic pressure from tricuspid regurgitation is moderately elevated (48 mmHg).     CT Head Without Contrast    Result Date: 9/6/2023  CT HEAD WO CONTRAST Date of Exam: 9/6/2023 9:20 PM EDT Indication: head injury. Comparison: None available. Technique: Axial CT images were obtained of the head without contrast administration.  Automated exposure control and iterative construction methods were used. Findings: There is a contusion at the right parietal scalp. No acute intracranial hemorrhage. No acute large territory infarct. There are mild scattered subcortical and periventricular white matter hypodensities which are  nonspecific and can be seen in the setting  of chronic small vessel ischemic change. No extra-axial collections. No midline shift or herniation. Normal size and configuration of the ventricles. Normal appearance of the orbits. The partially imaged paranasal sinuses are clear. The mastoid air cells are clear. No acute calvarial fracture. No acute osseous findings.     Impression: Impression: Right parietal scalp contusion. No acute intracranial findings. Electronically Signed: Mike Patel MD  9/6/2023 9:30 PM EDT  Workstation ID: [id number]    XR Chest 1 View    Result Date: 9/6/2023  XR CHEST 1 VW Date of Exam: 9/6/2023 7:25 PM EDT Indication: Dysrhythmia triage protocol Comparison: None available. Findings:  There is no acute infiltrate. There is no large pleural effusion. The cardiac silhouette is unremarkable. The visualized negra structures demonstrate no abnormality.     Impression: Impression: No acute pulmonary disease. Electronically Signed: Osvaldo Pantoja MD  9/6/2023 7:50 PM EDT  Workstation ID: [id number]     Results for orders placed during the hospital encounter of 09/06/23    Adult Transthoracic Echo Complete With Contrast if Necessary Per Protocol    Interpretation Summary    Left ventricular systolic function is normal. Estimated left ventricular EF = 52%    The left atrial cavity is moderately dilated.    Mild to moderate mitral regurgitation is present    Estimated right ventricular systolic pressure from tricuspid regurgitation is moderately elevated (48 mmHg).      Current medications:  Scheduled Meds:aspirin, 81 mg, Oral, Daily  atorvastatin, 20 mg, Oral, Nightly  escitalopram, 10 mg, Oral, Nightly  folic acid, 1 mg, Oral, Daily  lisinopril, 20 mg, Oral, Q24H   And  hydroCHLOROthiazide, 12.5 mg, Oral, Q24H  LORazepam, 1 mg, Oral, Q6H  magnesium sulfate, 2 g, Intravenous, Once  melatonin, 5 mg, Oral, Nightly  metoprolol tartrate, 50 mg, Oral, Q12H  multivitamin with minerals, 1 tablet, Oral,  Daily  prochlorperazine, 5 mg, Intravenous, Once  sodium chloride, 10 mL, Intravenous, Q12H  thiamine (B-1) IV, 200 mg, Intravenous, Q8H   Followed by  [START ON 9/12/2023] thiamine, 100 mg, Oral, Daily      Continuous Infusions:heparin, 12.5 Units/kg/hr, Last Rate: 12.5 Units/kg/hr (09/07/23 2105)  Pharmacy to Dose Heparin,       PRN Meds:.  acetaminophen    LORazepam **OR** LORazepam **OR** LORazepam **OR** LORazepam **OR** LORazepam **OR** LORazepam    Pharmacy to Dose Heparin    sodium chloride    sodium chloride    sodium chloride    Assessment & Plan   Assessment & Plan     Active Hospital Problems    Diagnosis  POA    **Atrial fibrillation with RVR [I48.91]  Yes    History of CVA (cerebrovascular accident) [Z86.73]  Not Applicable    Peripheral neuropathy [G62.9]  Yes    Falls [W19.XXXA]  Yes    Elevated troponin [R77.8]  Yes    Alcohol abuse [F10.10]  Yes    Hyponatremia [E87.1]  Yes    Essential hypertension [I10]  Yes    Mild depression [F32.A]  Yes      Resolved Hospital Problems   No resolved problems to display.        Brief Hospital Course to date:  Lizbeth Mckeon is a 73 y.o. female with hx of HTN, CAD, CVA, atrial tachycardia s/p ablation in 2006, chronic hyponatremia, peripheral neuropathy, carotid artery anomaly s/p CEA, and alcohol abuse who presented to the ER following a fall while taking out trash. She hit her head but did not lose consciousness. Was found to be in A-fib with RVR requiring Cardizem drip. Cardiology following.    This patient's problems and plans were partially entered by my partner and updated as appropriate by me 09/08/23.    Atrial fibrillation with RVR  History of atrial tachycardia  - New diagnosis this admission, symptoms present for several months, unclear etiology   - Echo 9/6/23 with EF 52%, LV diastolic dysfunction noted, left atrial cavity moderately dilated, mild to moderate MR, moderately elevated RVSP from TR (48 mmHg)  - required cardizem gtt on admission   -  Cardiology following, assisting with management. Increased Lopressor to 50 every 12 hours. Continue heparin gtt. Recommended aspirin, plan to start Eliquis 1 week after discharge and follow-up with EP cardiology as outpatient for watchman evaluation (patient not the best candidate for anticoagulation given history of falls).     Acute alcohol withdrawal complicated by delirium   - Drinks 1-2 bottles of wine nightly, confirmed by son  - Continue scheduled Ativan 1 mg q6h and CIWA protocol. Continue multivitamin, folic acid, thiamine. MRI brain ordered to rule out alterative cause of encephalopathy.     Headache  - In setting of acute alcohol withdrawal  - Ordered trial of magnesium and compazine. PRN Tylenol.     Chronic hyponatremia  - Suspect in setting of chronic alcohol use  - Na 134 on admit, baseline 130s  - Continue to monitor daily.     Mechanical fall  - Family reports multiple falls per month  - CTH on admission showed right parietal scalp contusion, no acute intracranial findings.   - MRI brain ordered as above.     Hypertension  - Continue home lisinopril, HCTZ added this admission. Continue lopressor 50 q12h.     CAD  - Continue home ASA and atorvastatin.     Peripheral neuropathy  - Unclear etiology, suspect could be related to alcohol use  - Outpatient follow up.     Depression  - Continue home Lexapro.     Expected Discharge Location and Transportation: home vs rehab  Expected Discharge: pending clinical improvement  Expected Discharge Date: 9/9/2023; Expected Discharge Time:      DVT prophylaxis:  Medical DVT prophylaxis orders are present.     AM-PAC 6 Clicks Score (PT): 17 (09/07/23 5663)    CODE STATUS:   Code Status and Medical Interventions:   Ordered at: 09/06/23 7834     Code Status (Patient has no pulse and is not breathing):    CPR (Attempt to Resuscitate)     Medical Interventions (Patient has pulse or is breathing):    Full Support       Adilene Leon, DO  09/08/23

## 2023-09-08 NOTE — PLAN OF CARE
Goal Outcome Evaluation:   PT was in Afib on the monitor. Pt was stable on monitor.

## 2023-09-08 NOTE — PROGRESS NOTES
Met with patient she is agreeable to [facility]. Verified PCP. Malgorzata SEGURA, [facility] [place]-Liaison

## 2023-09-08 NOTE — PROGRESS NOTES
[facility]  IP Progress Note      Chief Complaint/Reason for service: A-fib RVR #2 shortness of breath    Subjective   Subjective: The patient states she got little short of breath last night was placed on oxygen.  She denies any anginal pain.  She wants to know when she can possibly go home.    Past medical, surgical, social and family history reviewed in the patient's electronic medical record.    Objective     Vital Sign Min/Max for last 24 hours  Temp  Min: 97.6 °F (36.4 °C)  Max: 98.7 °F (37.1 °C)   BP  Min: 111/60  Max: 167/99   Pulse  Min: 73  Max: 113   Resp  Min: 18  Max: 18   SpO2  Min: 90 %  Max: 96 %   Flow (L/min)  Min: 2  Max: 2      Intake/Output Summary (Last 24 hours) at 9/8/2023 0733  Last data filed at 9/7/2023 1700  Gross per 24 hour   Intake 360 ml   Output 350 ml   Net 10 ml             Current Facility-Administered Medications:     acetaminophen (TYLENOL) tablet 650 mg, 650 mg, Oral, Q6H PRN, Sophia Gordon PA-C, 650 mg at 09/07/23 2231    aspirin EC tablet 81 mg, 81 mg, Oral, Daily, Darlene Arredondo MD, 81 mg at 09/07/23 1203    atorvastatin (LIPITOR) tablet 20 mg, 20 mg, Oral, Nightly, Day, Edel BOWEN MD, 20 mg at 09/07/23 2054    dilTIAZem (CARDIZEM) 125 mg in sodium chloride 0.9 % 125 mL infusion, 5-15 mg/hr, Intravenous, Titrated, Tony Schwartz MD, Stopped at 09/07/23 1204    escitalopram (LEXAPRO) tablet 10 mg, 10 mg, Oral, Nightly, Day, Edel BOWEN MD, 10 mg at 09/07/23 2109    folic acid (FOLVITE) tablet 1 mg, 1 mg, Oral, Daily, Day, Edel BOWEN MD, 1 mg at 09/07/23 0812    furosemide (LASIX) injection 20 mg, 20 mg, Intravenous, Once, German Moreland MD    heparin 82538 units/250 mL (100 units/mL) in 0.45 % NaCl infusion, 12.5 Units/kg/hr, Intravenous, Titrated, Aleksandra Mazariegos, RPH, Last Rate: 11.67 mL/hr at 09/07/23 2105, 12.5 Units/kg/hr at 09/07/23 2105    lisinopril (PRINIVIL,ZESTRIL) tablet 20 mg, 20 mg, Oral, Q24H, 20 mg at 09/07/23  0812 **AND** hydroCHLOROthiazide (HYDRODIURIL) oral 12.5 mg, 12.5 mg, Oral, Q24H, Day, Edel BOWEN MD    LORazepam (ATIVAN) tablet 1 mg, 1 mg, Oral, Q1H PRN **OR** LORazepam (ATIVAN) injection 1 mg, 1 mg, Intravenous, Q1H PRN **OR** LORazepam (ATIVAN) tablet 2 mg, 2 mg, Oral, Q1H PRN **OR** LORazepam (ATIVAN) injection 2 mg, 2 mg, Intravenous, Q1H PRN **OR** LORazepam (ATIVAN) injection 2 mg, 2 mg, Intravenous, Q15 Min PRN **OR** LORazepam (ATIVAN) injection 2 mg, 2 mg, Intramuscular, Q15 Min PRN, Edel Elder MD    [] LORazepam (ATIVAN) tablet 2 mg, 2 mg, Oral, Q6H, 2 mg at 23 1758 **FOLLOWED BY** LORazepam (ATIVAN) tablet 1 mg, 1 mg, Oral, Q6H, Day, Edel BOWEN MD, 1 mg at 23 0656    melatonin tablet 5 mg, 5 mg, Oral, Nightly, Day, Edel BOWEN MD, 5 mg at 23 2109    metoprolol tartrate (LOPRESSOR) tablet 50 mg, 50 mg, Oral, Q12H, German Moreland MD    multivitamin with minerals 1 tablet, 1 tablet, Oral, Daily, Day, Edel BOWEN MD, 1 tablet at 23 0812    Pharmacy to Dose Heparin, , Does not apply, Continuous PRN, Tony Schwartz MD    sodium chloride 0.9 % flush 10 mL, 10 mL, Intravenous, PRN, Tony Schwartz MD    sodium chloride 0.9 % flush 10 mL, 10 mL, Intravenous, Q12H, Day, Edel BOWEN MD, 10 mL at 23 211    sodium chloride 0.9 % flush 10 mL, 10 mL, Intravenous, PRN, DayEdel MD    sodium chloride 0.9 % infusion 40 mL, 40 mL, Intravenous, PRN, Day, Edel BOWEN MD    thiamine (B-1) injection 200 mg, 200 mg, Intravenous, Q8H, 200 mg at 23 0656 **FOLLOWED BY** [START ON 2023] thiamine (VITAMIN B-1) tablet 100 mg, 100 mg, Oral, Daily, Day, Edel BOWEN MD    Physical Exam: General pleasant female lying on her left side sat 91% heart rate 111        HEENT: No JVP.  Nasal O2 present.       Respiratory: Equal bilateral symmetrical expansion with crackles at the bases       Cardiovascular: Tachycardic and irregular with soft murmur no edema palpation       GI: Soft        Lower Extremities: No lesions       Neuro: Facial expressions symmetrical moves all 4 extremities       Skin: Warm and dry no edema palpation       Psych: Pleasant affect    Results Review: Intake and output are essentially even.  Recorded heart rates are 90s to 113.  Blood pressures 1 31-1 64.  GFR 85.9 sodium 129    Radiology Results:  Imaging Results (Last 72 Hours)       Procedure Component Value Units Date/Time    CT Head Without Contrast [[id number]] Collected: 09/06/23 2125     Updated: 09/06/23 2133    Narrative:      CT HEAD WO CONTRAST    Date of Exam: 9/6/2023 9:20 PM EDT    Indication: head injury.    Comparison: None available.    Technique: Axial CT images were obtained of the head without contrast administration.  Automated exposure control and iterative construction methods were used.      Findings:  There is a contusion at the right parietal scalp. No acute intracranial hemorrhage. No acute large territory infarct. There are mild scattered subcortical and periventricular white matter hypodensities which are nonspecific and can be seen in the setting   of chronic small vessel ischemic change. No extra-axial collections. No midline shift or herniation. Normal size and configuration of the ventricles. Normal appearance of the orbits. The partially imaged paranasal sinuses are clear. The mastoid air   cells are clear. No acute calvarial fracture. No acute osseous findings.      Impression:      Impression:  Right parietal scalp contusion. No acute intracranial findings.        Electronically Signed: Mike Patel MD    9/6/2023 9:30 PM EDT    Workstation ID: [id number]    XR Chest 1 View [[id number]] Collected: 09/06/23 1950     Updated: 09/06/23 1953    Narrative:      XR CHEST 1 VW    Date of Exam: 9/6/2023 7:25 PM EDT    Indication: Dysrhythmia triage protocol    Comparison: None available.    Findings:         There is no acute infiltrate.     There is no large pleural effusion.    The cardiac  silhouette is unremarkable.    The visualized engra structures demonstrate no abnormality.        Impression:      Impression:    No acute pulmonary disease.      Electronically Signed: Osvaldo Pantoja MD    9/6/2023 7:50 PM EDT    Workstation ID: [id number]            EKG: A-fib RVR    ECHO: EF 52%    Tele: Patient still gets significant A-fib RVR episodes.  Resting heart rate this morning 111    Assessment   Assessment/Plan: A-fib RVR-we will increase metoprolol to 50 mg every 12 hours.  Plan is to start Eliquis in 6 days.  Follow-up 1 to 2 weeks after discharge to discuss Watchman  2 patient complains of some shortness of breath was placed on oxygen.  Clinically she has crackles.  I will give her a dose of Lasix 20 mg IV x1 and check a BNP  Patient may be ready for discharge tomorrow    German Moreland MD  09/08/23  07:33 EDT

## 2023-09-08 NOTE — CASE MANAGEMENT/SOCIAL WORK
Discharge Planning Assessment  [facility]     Patient Name: Lizbeth Mckeon  MRN: [id number]  Today's Date: 9/8/2023    Admit Date: 9/6/2023    Plan: Home with HH   Discharge Needs Assessment    No documentation.                  Discharge Plan       Row Name 09/08/23 1149       Plan    Plan Home with HH    Patient/Family in Agreement with Plan yes    Plan Comments Met with pt at bedside to f/u DCP.  Discussed therapy recs for IRF, however, pt declined and advised that her brother can/will assist her if needed.  CM also discussed possible HH services.  She is agreeable and prefers this over her current OP PT.  Per request, CM called a referral to Malgor[facility] at [facility] (accepted).  No other needs identified/voiced.  CM will cont to follow.    Final Discharge Disposition Code 06 - home with home health care                  Continued Care and Services - Admitted Since 9/6/2023       Home Medical Care       Service Provider Request Status Selected Services Address Phone Fax Patient Preferred    [facility]  [facility] [address] [id number] [phone] --                  Expected Discharge Date and Time       Expected Discharge Date Expected Discharge Time    Sep 9, 2023            Demographic Summary    No documentation.                  Functional Status    No documentation.                  Psychosocial    No documentation.                  Abuse/Neglect    No documentation.                  Legal    No documentation.                  Substance Abuse    No documentation.                  Patient Forms    No documentation.                     Harmony Menendez RN

## 2023-09-09 ENCOUNTER — HOME HEALTH ADMISSION (OUTPATIENT)
Dept: HOME HEALTH SERVICES | Facility: HOME HEALTHCARE | Age: 73
End: 2023-09-09
Payer: MEDICARE

## 2023-09-09 VITALS
DIASTOLIC BLOOD PRESSURE: 92 MMHG | TEMPERATURE: 97.6 F | HEART RATE: 79 BPM | HEIGHT: 67 IN | RESPIRATION RATE: 18 BRPM | OXYGEN SATURATION: 93 % | BODY MASS INDEX: 32.96 KG/M2 | WEIGHT: 210 LBS | SYSTOLIC BLOOD PRESSURE: 154 MMHG

## 2023-09-09 LAB
ANION GAP SERPL CALCULATED.3IONS-SCNC: 13 MMOL/L (ref 5–15)
BUN SERPL-MCNC: 10 MG/DL (ref 8–23)
BUN/CREAT SERPL: 15.9 (ref 7–25)
CALCIUM SPEC-SCNC: 8.7 MG/DL (ref 8.6–10.5)
CHLORIDE SERPL-SCNC: 93 MMOL/L (ref 98–107)
CO2 SERPL-SCNC: 26 MMOL/L (ref 22–29)
CREAT SERPL-MCNC: 0.63 MG/DL (ref 0.57–1)
EGFRCR SERPLBLD CKD-EPI 2021: 92.6 ML/MIN/1.73
GLUCOSE SERPL-MCNC: 117 MG/DL (ref 65–99)
POTASSIUM SERPL-SCNC: 3.6 MMOL/L (ref 3.5–5.2)
SODIUM SERPL-SCNC: 132 MMOL/L (ref 136–145)
UFH PPP CHRO-ACNC: 0.26 IU/ML (ref 0.3–0.7)
UFH PPP CHRO-ACNC: 0.32 IU/ML (ref 0.3–0.7)

## 2023-09-09 PROCEDURE — 25010000002 LORAZEPAM PER 2 MG: Performed by: INTERNAL MEDICINE

## 2023-09-09 PROCEDURE — 85520 HEPARIN ASSAY: CPT

## 2023-09-09 PROCEDURE — 25010000002 THIAMINE PER 100 MG: Performed by: INTERNAL MEDICINE

## 2023-09-09 PROCEDURE — 25010000002 KETOROLAC TROMETHAMINE PER 15 MG: Performed by: NURSE PRACTITIONER

## 2023-09-09 PROCEDURE — 99239 HOSP IP/OBS DSCHRG MGMT >30: CPT | Performed by: STUDENT IN AN ORGANIZED HEALTH CARE EDUCATION/TRAINING PROGRAM

## 2023-09-09 PROCEDURE — 80048 BASIC METABOLIC PNL TOTAL CA: CPT | Performed by: STUDENT IN AN ORGANIZED HEALTH CARE EDUCATION/TRAINING PROGRAM

## 2023-09-09 RX ORDER — METOPROLOL TARTRATE 50 MG/1
50 TABLET, FILM COATED ORAL EVERY 12 HOURS SCHEDULED
Qty: 60 TABLET | Refills: 2 | Status: SHIPPED | OUTPATIENT
Start: 2023-09-09

## 2023-09-09 RX ORDER — FOLIC ACID 1 MG/1
1 TABLET ORAL DAILY
Qty: 30 TABLET | Refills: 1 | Status: SHIPPED | OUTPATIENT
Start: 2023-09-10

## 2023-09-09 RX ORDER — KETOROLAC TROMETHAMINE 15 MG/ML
15 INJECTION, SOLUTION INTRAMUSCULAR; INTRAVENOUS ONCE
Status: COMPLETED | OUTPATIENT
Start: 2023-09-09 | End: 2023-09-09

## 2023-09-09 RX ORDER — ASPIRIN 81 MG/1
81 TABLET ORAL DAILY
Qty: 30 TABLET | Refills: 1 | Status: SHIPPED | OUTPATIENT
Start: 2023-09-10

## 2023-09-09 RX ORDER — LORAZEPAM 1 MG/1
TABLET ORAL
Qty: 16 TABLET | Refills: 0 | Status: SHIPPED | OUTPATIENT
Start: 2023-09-09 | End: 2023-09-14

## 2023-09-09 RX ADMIN — THIAMINE HYDROCHLORIDE 200 MG: 100 INJECTION, SOLUTION INTRAMUSCULAR; INTRAVENOUS at 13:35

## 2023-09-09 RX ADMIN — LORAZEPAM 2 MG: 2 INJECTION INTRAMUSCULAR; INTRAVENOUS at 02:19

## 2023-09-09 RX ADMIN — KETOROLAC TROMETHAMINE 15 MG: 15 INJECTION, SOLUTION INTRAMUSCULAR; INTRAVENOUS at 02:14

## 2023-09-09 RX ADMIN — LISINOPRIL 20 MG: 20 TABLET ORAL at 09:00

## 2023-09-09 RX ADMIN — ASPIRIN 81 MG: 81 TABLET, COATED ORAL at 08:58

## 2023-09-09 RX ADMIN — LORAZEPAM 1 MG: 1 TABLET ORAL at 08:46

## 2023-09-09 RX ADMIN — FOLIC ACID 1 MG: 1 TABLET ORAL at 08:58

## 2023-09-09 RX ADMIN — LORAZEPAM 1 MG: 2 INJECTION INTRAMUSCULAR; INTRAVENOUS at 01:18

## 2023-09-09 RX ADMIN — THIAMINE HYDROCHLORIDE 200 MG: 100 INJECTION, SOLUTION INTRAMUSCULAR; INTRAVENOUS at 06:23

## 2023-09-09 RX ADMIN — MULTIPLE VITAMINS W/ MINERALS TAB 1 TABLET: TAB at 09:02

## 2023-09-09 RX ADMIN — METOPROLOL TARTRATE 50 MG: 50 TABLET ORAL at 09:02

## 2023-09-09 RX ADMIN — Medication 10 ML: at 09:03

## 2023-09-09 RX ADMIN — HYDROCHLOROTHIAZIDE 12.5 MG: 12.5 CAPSULE ORAL at 09:00

## 2023-09-09 NOTE — PROGRESS NOTES
HEPARIN INFUSION  Lizbeth Mckeon is a  73 y.o. female receiving heparin infusion.     Therapy for (VTE/Cardiac): Cardiac  Patient Weight: 93.4 kg  Initial Bolus (Y/N): Y  Any Bolus (Y/N): Y        Signs or Symptoms of Bleeding: no new signs/symptoms noted     Cardiac or Other (Not VTE)   Initial Bolus: 60 units/kg (Max 4,000 units)  Initial rate: 12 units/kg/hr (Max 1,000 units/hr)   Anti Xa Rebolus Infusion Hold time Change infusion Dose (Units/kg/hr) Next Anti Xa or aPTT Level Due   < 0.11 50 Units/kg  (4000 Units Max) None Increase by  3 Units/kg/hr 6 hours   0.11- 0.19 25 Units/kg  (2000 Units Max) None Increase by  2 Units/kg/hr 6 hours   0.2 - 0.29 0 None Increase by  1 Units/kg/hr 6 hours   0.3 - 0.5 0 None No Change 6 hours (after 2 consecutive levels in range check qAM)   0.51 - 0.6 0 None Decrease by  1 Units/kg/hr 6 hours   0.61 - 0.8 0 30 Minutes Decrease by  2 Units/kg/hr 6 hours   0.81 - 1 0 60 Minutes Decrease by  3 Units/kg/hr 6 hours   >1 0 Hold  After Anti Xa less than 0.5 decrease previous rate by  4 Units/kg/hr  Every 2 hours until Anti Xa  less than 0.5 then when infusion restarts in 6 hours       Results from last 7 days   Lab Units 09/08/23  0210 09/07/23  0403 09/06/23  1935   INR   --   --  1.04   HEMOGLOBIN g/dL 12.2 12.1 12.9   HEMATOCRIT % 36.6 36.1 38.6   PLATELETS 10*3/mm3 247 258 272          Date   Time   Anti-Xa Current Rate (Unit/kg/hr) Bolus   (Units) Rate Change   (Unit/kg/hr) New Rate (Unit/kg/hr) Next   Anti-Xa Comments  Pump Check Daily   9/6 2100 0.1 0 4000 +10.5 10.5 0400 DW RN, pump verified   9/7 0403 0.27 10.5 -- +1 11.5 1100 D/w RN   9/7 1111 0.37 11.5 -- 0 11.5 1800 D/W RN. Pump checked    9/7 1824 0.25 11.5 -- +1 12.5 0200 D/w IMELDA Perez   9/8 0210 0.34 12.5  -- -- 12.5 0800 Discussed w/ nurse   9/8 0914 0.36 12.5 -- -- 12.5 0600 D/w RN   9/9 0535 0.26 12.5 -- +1 13.5 1400 D/w IMELDA Mccracken

## 2023-09-09 NOTE — DISCHARGE SUMMARY
[facility] Medicine Services  DISCHARGE SUMMARY    Patient Name: Lizbeth Mckeon  : 1950  MRN: [id number]    Date of Admission: 2023  7:10 PM  Date of Discharge:  23  Primary Care Physician: Nydia Guzmán MD    Consults       Date and Time Order Name Status Description    2023 12:34 AM Inpatient Cardiology Consult Completed             Hospital Course     Presenting Problem: Mechanical fall     Active Hospital Problems    Diagnosis  POA    **Atrial fibrillation with RVR [I48.91]  Yes    History of CVA (cerebrovascular accident) [Z86.73]  Not Applicable    Peripheral neuropathy [G62.9]  Yes    Falls [W19.XXXA]  Yes    Elevated troponin [R77.8]  Yes    Alcohol abuse [F10.10]  Yes    Hyponatremia [E87.1]  Yes    Essential hypertension [I10]  Yes    Mild depression [F32.A]  Yes      Resolved Hospital Problems   No resolved problems to display.          Hospital Course:  Lizbeth Mckeon is a 73 y.o. female with hx of HTN, CAD, CVA, atrial tachycardia s/p ablation in , chronic hyponatremia, peripheral neuropathy, carotid artery anomaly s/p CEA, and alcohol abuse who presented to the ER following a fall while taking out trash. She hit her head but did not lose consciousness. She was found to be in A-fib with RVR initially requiring Cardizem drip. Cardiology evaluated, transitioned her to oral metoprolol. Plan to start Eliquis 1 week after discharge and follow-up with EP Cardiology as outpatient for watchman evaluation. Hospital course was complicated by acute alcohol withdrawal requiring scheduled Ativan taper. Patient was discharged home with plan to complete remainder of Ativan taper at home.      Discharge Follow Up Recommendations for outpatient labs/diagnostics:  - Follow up with PCP in 1 week for hypertension  - Follow up with Cardiology in 1 week for afib and initiation of anticoagulation     Day of Discharge     HPI:   Evaluated patient this morning. Son  present at bedside. Patient reports mild shortness of breath and headache, denies chest pain and nausea. Reports feeling tired.        Vital Signs:   Temp:  [97.4 °F (36.3 °C)-98.6 °F (37 °C)] 97.6 °F (36.4 °C)  Heart Rate:  [] 79  Resp:  [16-20] 18  BP: (145-187)/() 154/92  Flow (L/min):  [2] 2      Physical Exam:  Gen- no acute distress, alert  HENT- NCAT, mucous membranes moist  CV- irregularly irregular, S1 S2 normal, no m/r/g  Resp- CTAB, no wheezes or rales  Abd- soft, NT, ND, +BS  Ext- no edema  Neuro- A&Ox2 (not to date), no focal deficits, sensation to light touch decreased in bilateral lower extremities.  Skin- no rashes  Psych- appropriate mood    Pertinent  and/or Most Recent Results     LAB RESULTS:      Lab 09/09/23  0739 09/09/23  0535 09/08/23  0839 09/08/23  0210 09/07/23  1824 09/07/23  1111 09/07/23  0403 09/06/23  2352 09/06/23  1935   WBC  --   --   --  6.37  --   --  5.41  --  5.95   HEMOGLOBIN  --   --   --  12.2  --   --  12.1  --  12.9   HEMATOCRIT  --   --   --  36.6  --   --  36.1  --  38.6   PLATELETS  --   --   --  247  --   --  258  --  272   NEUTROS ABS  --   --   --   --   --   --  2.70  --  3.07   IMMATURE GRANS (ABS)  --   --   --   --   --   --  0.00  --  0.01   LYMPHS ABS  --   --   --   --   --   --  2.18  --  2.32   MONOS ABS  --   --   --   --   --   --  0.36  --  0.42   EOS ABS  --   --   --   --   --   --  0.14  --  0.10   MCV  --   --   --  96.8  --   --  95.5  --  96.5   PROTIME  --   --   --   --   --   --   --   --  13.7   APTT  --   --   --   --   --   --   --   --  29.4*   HEPARIN ANTI-XA 0.32 0.26* 0.36 0.34 0.25*   < > 0.27*   < > 0.10*    < > = values in this interval not displayed.         Lab 09/09/23  0739 09/08/23  0210 09/06/23  1935   SODIUM 132* 129* 134*   POTASSIUM 3.6 4.0 4.3   CHLORIDE 93* 97* 97*   CO2 26.0 23.0 24.0   ANION GAP 13.0 9.0 13.0   BUN 10 14 13   CREATININE 0.63 0.73 0.65   EGFR 92.6 85.9 91.9   GLUCOSE 117* 98 108*   CALCIUM 8.7  8.7 9.3   MAGNESIUM  --  1.8 1.8   TSH  --   --  1.160         Lab 09/06/23  1935   TOTAL PROTEIN 6.8   ALBUMIN 4.0   GLOBULIN 2.8   ALT (SGPT) 16   AST (SGOT) 23   BILIRUBIN 0.5   ALK PHOS 80         Lab 09/07/23  0159 09/06/23  2352 09/06/23 1935   PROBNP  --   --  1,833.0*   HSTROP T 13* 14* 15*   PROTIME  --   --  13.7   INR  --   --  1.04                 Brief Urine Lab Results  (Last result in the past 365 days)        Color   Clarity   Blood   Leuk Est   Nitrite   Protein   CREAT   Urine HCG        07/24/23 1125 Yellow   Clear     Moderate                     Microbiology Results (last 10 days)       ** No results found for the last 240 hours. **            Adult Transthoracic Echo Complete With Contrast if Necessary Per Protocol    Result Date: 9/7/2023    Left ventricular systolic function is normal. Estimated left ventricular EF = 52%   The left atrial cavity is moderately dilated.   Mild to moderate mitral regurgitation is present   Estimated right ventricular systolic pressure from tricuspid regurgitation is moderately elevated (48 mmHg).     CT Head Without Contrast    Result Date: 9/6/2023  CT HEAD WO CONTRAST Date of Exam: 9/6/2023 9:20 PM EDT Indication: head injury. Comparison: None available. Technique: Axial CT images were obtained of the head without contrast administration.  Automated exposure control and iterative construction methods were used. Findings: There is a contusion at the right parietal scalp. No acute intracranial hemorrhage. No acute large territory infarct. There are mild scattered subcortical and periventricular white matter hypodensities which are nonspecific and can be seen in the setting  of chronic small vessel ischemic change. No extra-axial collections. No midline shift or herniation. Normal size and configuration of the ventricles. Normal appearance of the orbits. The partially imaged paranasal sinuses are clear. The mastoid air cells are clear. No acute calvarial  fracture. No acute osseous findings.     Impression: Right parietal scalp contusion. No acute intracranial findings. Electronically Signed: Mike Patel MD  9/6/2023 9:30 PM EDT  Workstation ID: [id number]    MRI Brain Without Contrast    Result Date: 9/9/2023  MRI BRAIN WO CONTRAST Date of Exam: 9/8/2023 11:46 PM EDT Indication: Mental status change, unknown cause rule out stroke after fall.  Comparison: CT scan of the head from September 6, 2023 Technique:  Routine multiplanar/multisequence sequence images of the brain were obtained without contrast administration. Findings: There is a right parietal scalp hematoma measuring about 1.6 x 1.9 cm. There is a fluid/fluid level consistent with hematocrit effect from hemorrhage. There is diffuse generalized atrophy. There is mild increased T2 and FLAIR signal throughout the bilateral periventricular and subcortical white matter consistent with chronic microvascular ischemia. There is no mass, mass effect or midline shift. There are no abnormal extra-axial fluid collections. There is a polyp in the posterior nasopharyngeal space to the right side of the posterior nasal septum. The sinuses and mastoid air cells are clear.     Impression: No acute intracranial abnormality. Right parietal scalp subcutaneous hematoma. Electronically Signed: Kodi Zapien MD  9/9/2023 3:18 AM EDT  Workstation ID: [id number]    XR Chest 1 View    Result Date: 9/6/2023  XR CHEST 1 VW Date of Exam: 9/6/2023 7:25 PM EDT Indication: Dysrhythmia triage protocol Comparison: None available. Findings:  There is no acute infiltrate. There is no large pleural effusion. The cardiac silhouette is unremarkable. The visualized negra structures demonstrate no abnormality.     Impression: No acute pulmonary disease. Electronically Signed: Osvaldo Pantoja MD  9/6/2023 7:50 PM EDT  Workstation ID: [id number]     Results for orders placed during the hospital encounter of 02/15/23    Bilateral Carotid  Duplex    Interpretation Summary    No evidence of hemodynamically significant stenosis of bilateral carotid arteries.    Intimal thickening and bilateral mild plaque is noted.      Results for orders placed during the hospital encounter of 02/15/23    Bilateral Carotid Duplex    Interpretation Summary    No evidence of hemodynamically significant stenosis of bilateral carotid arteries.    Intimal thickening and bilateral mild plaque is noted.      Results for orders placed during the hospital encounter of 09/06/23    Adult Transthoracic Echo Complete With Contrast if Necessary Per Protocol    Interpretation Summary    Left ventricular systolic function is normal. Estimated left ventricular EF = 52%    The left atrial cavity is moderately dilated.    Mild to moderate mitral regurgitation is present    Estimated right ventricular systolic pressure from tricuspid regurgitation is moderately elevated (48 mmHg).      Plan for Follow-up of Pending Labs/Results:     Discharge Details        Discharge Medications        New Medications        Instructions Start Date   Aspirin Low Dose 81 MG EC tablet  Generic drug: aspirin   81 mg, Oral, Daily   Start Date: September 10, 2023     folic acid 1 MG tablet  Commonly known as: FOLVITE   1 mg, Oral, Daily   Start Date: September 10, 2023     LORazepam 1 MG tablet  Commonly known as: Ativan   Take 2 tablets by mouth Every 8 (Eight) Hours for 1 day, THEN 1 tablet Every 6 (Six) Hours for 1 day, THEN 1 tablet Every 8 (Eight) Hours for 1 day, THEN 1 tablet Every 12 (Twelve) Hours for 1 day, THEN 1 tablet Every Night for 1 day.   Start Date: September 9, 2023     metoprolol tartrate 50 MG tablet  Commonly known as: LOPRESSOR   50 mg, Oral, Every 12 Hours Scheduled             Continue These Medications        Instructions Start Date   escitalopram 10 MG tablet  Commonly known as: LEXAPRO   1 tablet, Oral, Nightly      lisinopril 20 MG tablet  Commonly known as: PRINIVIL,ZESTRIL   20  mg, Oral, Daily      melatonin 5 MG tablet tablet   1 tablet, Oral, Nightly      Multi-Vitamin/Fluoride/Iron 0.25-10 MG/ML solution   Oral      multivitamin with minerals tablet tablet   1 tablet, Oral, Nightly      simvastatin 40 MG tablet  Commonly known as: ZOCOR   1 tablet, Oral, Nightly      traZODone 50 MG tablet  Commonly known as: DESYREL   50 mg, Oral, Nightly               Allergies   Allergen Reactions    Penicillins Hives    Sulfa Antibiotics GI Intolerance         Discharge Disposition:  Home or Self Care    Diet:  Hospital:  Diet Order   Procedures    Diet: Cardiac Diets; Healthy Heart (2-3 Na+); Texture: Regular Texture (IDDSI 7); Fluid Consistency: Thin (IDDSI 0)       Activity:  Activity Instructions       Measure Blood Pressure              Restrictions or Other Recommendations:  None       CODE STATUS:    Code Status and Medical Interventions:   Ordered at: 09/06/23 5681     Code Status (Patient has no pulse and is not breathing):    CPR (Attempt to Resuscitate)     Medical Interventions (Patient has pulse or is breathing):    Full Support       Future Appointments   Date Time Provider Department Center   10/16/2023  8:45 AM Naseem Drake DO [facility]       Additional Instructions for the Follow-ups that You Need to Schedule       Ambulatory Referral to Home Health   As directed      Face to Face Visit Date: 9/8/2023   Follow-up provider for Plan of Care?: I treated the patient in an acute care facility and will not continue treatment after discharge.   Follow-up provider: DEEJAY ZEE [[id number]]   Reason/Clinical Findings: a-fib; falls   Describe mobility limitations that make leaving home difficult: Impaired functional mobility, gait, balance, endurance   Nursing/Therapeutic Services Requested: Skilled Nursing (PT to assess for OT needs) Physical Therapy   Skilled nursing orders: Medication education Cardiopulmonary assessments   PT orders: Therapeutic exercise Gait Training  Transfer training Strengthening   Weight Bearing Status: As Tolerated   Frequency: 1 Week 1        Discharge Follow-up with PCP   As directed       Currently Documented PCP:    Nydia Guzmán MD    PCP Phone Number:    [phone]     Follow Up Details: in 1 week to recheck blood pressure        Discharge Follow-up with Specified Provider:  [facility] in 1 week; 1 Week   As directed      To:  [facility] in 1 week   Follow Up: 1 Week                      Adilene Leon DO  09/09/23      Time Spent on Discharge:  I spent  >30  minutes on this discharge activity which included: face-to-face encounter with the patient, reviewing the data in the system, coordination of the care with the nursing staff as well as consultants, documentation, and entering orders.

## 2023-09-11 ENCOUNTER — HOME CARE VISIT (OUTPATIENT)
Dept: HOME HEALTH SERVICES | Facility: HOME HEALTHCARE | Age: 73
End: 2023-09-11
Payer: MEDICARE

## 2023-09-11 PROCEDURE — G0299 HHS/HOSPICE OF RN EA 15 MIN: HCPCS

## 2023-09-12 ENCOUNTER — HOME CARE VISIT (OUTPATIENT)
Dept: HOME HEALTH SERVICES | Facility: HOME HEALTHCARE | Age: 73
End: 2023-09-12
Payer: MEDICARE

## 2023-09-12 VITALS
BODY MASS INDEX: 30.94 KG/M2 | HEIGHT: 68 IN | WEIGHT: 204.13 LBS | DIASTOLIC BLOOD PRESSURE: 80 MMHG | RESPIRATION RATE: 18 BRPM | HEART RATE: 70 BPM | TEMPERATURE: 97.5 F | SYSTOLIC BLOOD PRESSURE: 156 MMHG

## 2023-09-12 NOTE — HOME HEALTH
SOC Note:    Home Health ordered for: disciplines SN, PT    Reason for Hosp/Primary Dx/Co-morbidities:Pt admitted to [facility] after a fall at home, found to be in A Fib with RVR. Pt went through ETOH withdrawal during hosp.  PMH includes hx of HTN, CAD, CVA, atrial tachycardia s/p ablation in 2006, chronic hyponatremia, peripheral neuropathy, carotid artery anomaly s/p CEA, and alcohol abuse.    Focus of Care: A Fib assessment and education    Patient's goal(s):to get stronger    Current Functional status/mobility/DME: ambulates short distance in home with rolling walker    HB status/Living Arrangements: brother living with pt currently, [facility] [place]    Skin Integrity/wound status: no skin breakdown    Code Status: CPR    Fall Risk/Safety concerns: high risk of falls    Medication issues/Concerns:pt stated she probably will not take the Ativan, does not feel it is needed, plans to discuss with her daughter and pcp. Plan is to start Eliquis at 1 week Cardiology f/up appt.    Additional Problems/Concerns:     SDOH Barriers (i.e. caregiver concerns, social isolation, transportation, food insecurity, environment, income etc.)/Need for MSW: na    Plan for next visit: CP assessment, med education, fall prevention

## 2023-09-14 ENCOUNTER — HOME CARE VISIT (OUTPATIENT)
Dept: HOME HEALTH SERVICES | Facility: HOME HEALTHCARE | Age: 73
End: 2023-09-14
Payer: MEDICARE

## 2023-09-27 LAB
QT INTERVAL: 390 MS
QTC INTERVAL: 469 MS